# Patient Record
Sex: FEMALE | Race: WHITE | NOT HISPANIC OR LATINO | URBAN - METROPOLITAN AREA
[De-identification: names, ages, dates, MRNs, and addresses within clinical notes are randomized per-mention and may not be internally consistent; named-entity substitution may affect disease eponyms.]

---

## 2019-12-05 ENCOUNTER — INPATIENT (INPATIENT)
Facility: HOSPITAL | Age: 64
LOS: 1 days | Discharge: LEFT BEFORE DISCHARGE | DRG: 291 | End: 2019-12-07
Attending: SPECIALIST/TECHNOLOGIST CARDIOVASCULAR | Admitting: SPECIALIST/TECHNOLOGIST CARDIOVASCULAR
Payer: MEDICARE

## 2019-12-05 VITALS
SYSTOLIC BLOOD PRESSURE: 151 MMHG | HEART RATE: 104 BPM | DIASTOLIC BLOOD PRESSURE: 89 MMHG | TEMPERATURE: 98 F | OXYGEN SATURATION: 97 % | RESPIRATION RATE: 19 BRPM

## 2019-12-05 DIAGNOSIS — Z90.710 ACQUIRED ABSENCE OF BOTH CERVIX AND UTERUS: Chronic | ICD-10-CM

## 2019-12-05 LAB
ALBUMIN SERPL ELPH-MCNC: 4.2 G/DL — SIGNIFICANT CHANGE UP (ref 3.3–5)
ALP SERPL-CCNC: 71 U/L — SIGNIFICANT CHANGE UP (ref 40–120)
ALT FLD-CCNC: 27 U/L — SIGNIFICANT CHANGE UP (ref 10–45)
ANION GAP SERPL CALC-SCNC: 15 MMOL/L — SIGNIFICANT CHANGE UP (ref 5–17)
ANION GAP SERPL CALC-SCNC: 17 MMOL/L — SIGNIFICANT CHANGE UP (ref 5–17)
APPEARANCE UR: CLEAR — SIGNIFICANT CHANGE UP
AST SERPL-CCNC: 29 U/L — SIGNIFICANT CHANGE UP (ref 10–40)
BACTERIA # UR AUTO: PRESENT /HPF
BASE EXCESS BLDV CALC-SCNC: -0.4 MMOL/L — SIGNIFICANT CHANGE UP
BASOPHILS # BLD AUTO: 0.04 K/UL — SIGNIFICANT CHANGE UP (ref 0–0.2)
BASOPHILS NFR BLD AUTO: 0.4 % — SIGNIFICANT CHANGE UP (ref 0–2)
BILIRUB SERPL-MCNC: 1.6 MG/DL — HIGH (ref 0.2–1.2)
BILIRUB UR-MCNC: NEGATIVE — SIGNIFICANT CHANGE UP
BUN SERPL-MCNC: 22 MG/DL — SIGNIFICANT CHANGE UP (ref 7–23)
BUN SERPL-MCNC: 27 MG/DL — HIGH (ref 7–23)
CALCIUM SERPL-MCNC: 9.3 MG/DL — SIGNIFICANT CHANGE UP (ref 8.4–10.5)
CALCIUM SERPL-MCNC: 9.4 MG/DL — SIGNIFICANT CHANGE UP (ref 8.4–10.5)
CHLORIDE SERPL-SCNC: 101 MMOL/L — SIGNIFICANT CHANGE UP (ref 96–108)
CHLORIDE SERPL-SCNC: 102 MMOL/L — SIGNIFICANT CHANGE UP (ref 96–108)
CK MB CFR SERPL CALC: 5 NG/ML — SIGNIFICANT CHANGE UP (ref 0–6.7)
CK SERPL-CCNC: 88 U/L — SIGNIFICANT CHANGE UP (ref 25–170)
CO2 SERPL-SCNC: 22 MMOL/L — SIGNIFICANT CHANGE UP (ref 22–31)
CO2 SERPL-SCNC: 24 MMOL/L — SIGNIFICANT CHANGE UP (ref 22–31)
COLOR SPEC: YELLOW — SIGNIFICANT CHANGE UP
CREAT SERPL-MCNC: 0.81 MG/DL — SIGNIFICANT CHANGE UP (ref 0.5–1.3)
CREAT SERPL-MCNC: 0.9 MG/DL — SIGNIFICANT CHANGE UP (ref 0.5–1.3)
D DIMER BLD IA.RAPID-MCNC: 406 NG/ML DDU — HIGH
DIFF PNL FLD: NEGATIVE — SIGNIFICANT CHANGE UP
EOSINOPHIL # BLD AUTO: 0 K/UL — SIGNIFICANT CHANGE UP (ref 0–0.5)
EOSINOPHIL NFR BLD AUTO: 0 % — SIGNIFICANT CHANGE UP (ref 0–6)
EPI CELLS # UR: SIGNIFICANT CHANGE UP /HPF (ref 0–5)
GLUCOSE SERPL-MCNC: 118 MG/DL — HIGH (ref 70–99)
GLUCOSE SERPL-MCNC: 130 MG/DL — HIGH (ref 70–99)
GLUCOSE UR QL: NEGATIVE — SIGNIFICANT CHANGE UP
HCO3 BLDV-SCNC: 23 MMOL/L — SIGNIFICANT CHANGE UP (ref 20–27)
HCT VFR BLD CALC: 39.1 % — SIGNIFICANT CHANGE UP (ref 34.5–45)
HGB BLD-MCNC: 12.4 G/DL — SIGNIFICANT CHANGE UP (ref 11.5–15.5)
HYALINE CASTS # UR AUTO: SIGNIFICANT CHANGE UP /LPF (ref 0–2)
IMM GRANULOCYTES NFR BLD AUTO: 0.5 % — SIGNIFICANT CHANGE UP (ref 0–1.5)
KETONES UR-MCNC: NEGATIVE — SIGNIFICANT CHANGE UP
LACTATE SERPL-SCNC: 1.4 MMOL/L — SIGNIFICANT CHANGE UP (ref 0.5–2)
LEUKOCYTE ESTERASE UR-ACNC: NEGATIVE — SIGNIFICANT CHANGE UP
LIDOCAIN IGE QN: 7 U/L — SIGNIFICANT CHANGE UP (ref 7–60)
LYMPHOCYTES # BLD AUTO: 1.09 K/UL — SIGNIFICANT CHANGE UP (ref 1–3.3)
LYMPHOCYTES # BLD AUTO: 11.4 % — LOW (ref 13–44)
MAGNESIUM SERPL-MCNC: 2.1 MG/DL — SIGNIFICANT CHANGE UP (ref 1.6–2.6)
MCHC RBC-ENTMCNC: 31.5 PG — SIGNIFICANT CHANGE UP (ref 27–34)
MCHC RBC-ENTMCNC: 31.7 GM/DL — LOW (ref 32–36)
MCV RBC AUTO: 99.2 FL — SIGNIFICANT CHANGE UP (ref 80–100)
MONOCYTES # BLD AUTO: 0.56 K/UL — SIGNIFICANT CHANGE UP (ref 0–0.9)
MONOCYTES NFR BLD AUTO: 5.9 % — SIGNIFICANT CHANGE UP (ref 2–14)
NEUTROPHILS # BLD AUTO: 7.83 K/UL — HIGH (ref 1.8–7.4)
NEUTROPHILS NFR BLD AUTO: 81.8 % — HIGH (ref 43–77)
NITRITE UR-MCNC: POSITIVE
NRBC # BLD: 0 /100 WBCS — SIGNIFICANT CHANGE UP (ref 0–0)
NT-PROBNP SERPL-SCNC: HIGH PG/ML (ref 0–300)
PCO2 BLDV: 35 MMHG — LOW (ref 41–51)
PH BLDV: 7.44 — HIGH (ref 7.32–7.43)
PH UR: 6 — SIGNIFICANT CHANGE UP (ref 5–8)
PLATELET # BLD AUTO: 227 K/UL — SIGNIFICANT CHANGE UP (ref 150–400)
PO2 BLDV: 38 MMHG — SIGNIFICANT CHANGE UP
POTASSIUM SERPL-MCNC: 3.4 MMOL/L — LOW (ref 3.5–5.3)
POTASSIUM SERPL-MCNC: 4.2 MMOL/L — SIGNIFICANT CHANGE UP (ref 3.5–5.3)
POTASSIUM SERPL-SCNC: 3.4 MMOL/L — LOW (ref 3.5–5.3)
POTASSIUM SERPL-SCNC: 4.2 MMOL/L — SIGNIFICANT CHANGE UP (ref 3.5–5.3)
PROT SERPL-MCNC: 6.5 G/DL — SIGNIFICANT CHANGE UP (ref 6–8.3)
PROT UR-MCNC: 30 MG/DL
RAPID RVP RESULT: SIGNIFICANT CHANGE UP
RBC # BLD: 3.94 M/UL — SIGNIFICANT CHANGE UP (ref 3.8–5.2)
RBC # FLD: 13.2 % — SIGNIFICANT CHANGE UP (ref 10.3–14.5)
RBC CASTS # UR COMP ASSIST: < 5 /HPF — SIGNIFICANT CHANGE UP
SAO2 % BLDV: 66 % — SIGNIFICANT CHANGE UP
SODIUM SERPL-SCNC: 139 MMOL/L — SIGNIFICANT CHANGE UP (ref 135–145)
SODIUM SERPL-SCNC: 142 MMOL/L — SIGNIFICANT CHANGE UP (ref 135–145)
SP GR SPEC: 1.02 — SIGNIFICANT CHANGE UP (ref 1–1.03)
TROPONIN T SERPL-MCNC: <0.01 NG/ML — SIGNIFICANT CHANGE UP (ref 0–0.01)
TROPONIN T SERPL-MCNC: <0.01 NG/ML — SIGNIFICANT CHANGE UP (ref 0–0.01)
UROBILINOGEN FLD QL: 0.2 E.U./DL — SIGNIFICANT CHANGE UP
WBC # BLD: 9.57 K/UL — SIGNIFICANT CHANGE UP (ref 3.8–10.5)
WBC # FLD AUTO: 9.57 K/UL — SIGNIFICANT CHANGE UP (ref 3.8–10.5)
WBC UR QL: < 5 /HPF — SIGNIFICANT CHANGE UP

## 2019-12-05 PROCEDURE — 93010 ELECTROCARDIOGRAM REPORT: CPT

## 2019-12-05 PROCEDURE — 93306 TTE W/DOPPLER COMPLETE: CPT | Mod: 26

## 2019-12-05 PROCEDURE — 74019 RADEX ABDOMEN 2 VIEWS: CPT | Mod: 26

## 2019-12-05 PROCEDURE — 99291 CRITICAL CARE FIRST HOUR: CPT

## 2019-12-05 PROCEDURE — 99285 EMERGENCY DEPT VISIT HI MDM: CPT

## 2019-12-05 PROCEDURE — 71045 X-RAY EXAM CHEST 1 VIEW: CPT | Mod: 26

## 2019-12-05 RX ORDER — APIXABAN 2.5 MG/1
5 TABLET, FILM COATED ORAL EVERY 12 HOURS
Refills: 0 | Status: DISCONTINUED | OUTPATIENT
Start: 2019-12-05 | End: 2019-12-06

## 2019-12-05 RX ORDER — NITROGLYCERIN 6.5 MG
20 CAPSULE, EXTENDED RELEASE ORAL
Qty: 50 | Refills: 0 | Status: DISCONTINUED | OUTPATIENT
Start: 2019-12-05 | End: 2019-12-05

## 2019-12-05 RX ORDER — METOPROLOL TARTRATE 50 MG
0 TABLET ORAL
Qty: 30 | Refills: 0 | DISCHARGE

## 2019-12-05 RX ORDER — METOPROLOL TARTRATE 50 MG
25 TABLET ORAL EVERY 12 HOURS
Refills: 0 | Status: DISCONTINUED | OUTPATIENT
Start: 2019-12-06 | End: 2019-12-06

## 2019-12-05 RX ORDER — METOPROLOL TARTRATE 50 MG
12.5 TABLET ORAL EVERY 12 HOURS
Refills: 0 | Status: DISCONTINUED | OUTPATIENT
Start: 2019-12-05 | End: 2019-12-05

## 2019-12-05 RX ORDER — BUDESONIDE AND FORMOTEROL FUMARATE DIHYDRATE 160; 4.5 UG/1; UG/1
2 AEROSOL RESPIRATORY (INHALATION)
Refills: 0 | Status: DISCONTINUED | OUTPATIENT
Start: 2019-12-05 | End: 2019-12-07

## 2019-12-05 RX ORDER — INFLUENZA VIRUS VACCINE 15; 15; 15; 15 UG/.5ML; UG/.5ML; UG/.5ML; UG/.5ML
0.5 SUSPENSION INTRAMUSCULAR ONCE
Refills: 0 | Status: COMPLETED | OUTPATIENT
Start: 2019-12-05 | End: 2019-12-05

## 2019-12-05 RX ORDER — HYDRALAZINE HCL 50 MG
0 TABLET ORAL
Qty: 90 | Refills: 0 | DISCHARGE

## 2019-12-05 RX ORDER — NITROGLYCERIN 6.5 MG
0.4 CAPSULE, EXTENDED RELEASE ORAL
Refills: 0 | Status: COMPLETED | OUTPATIENT
Start: 2019-12-05 | End: 2019-12-05

## 2019-12-05 RX ORDER — POLYETHYLENE GLYCOL 3350 17 G/17G
17 POWDER, FOR SOLUTION ORAL DAILY
Refills: 0 | Status: DISCONTINUED | OUTPATIENT
Start: 2019-12-05 | End: 2019-12-06

## 2019-12-05 RX ORDER — CHLORHEXIDINE GLUCONATE 213 G/1000ML
1 SOLUTION TOPICAL
Refills: 0 | Status: DISCONTINUED | OUTPATIENT
Start: 2019-12-05 | End: 2019-12-07

## 2019-12-05 RX ORDER — FUROSEMIDE 40 MG
80 TABLET ORAL ONCE
Refills: 0 | Status: COMPLETED | OUTPATIENT
Start: 2019-12-05 | End: 2019-12-05

## 2019-12-05 RX ORDER — NICARDIPINE HYDROCHLORIDE 30 MG/1
5 CAPSULE, EXTENDED RELEASE ORAL
Qty: 40 | Refills: 0 | Status: DISCONTINUED | OUTPATIENT
Start: 2019-12-05 | End: 2019-12-05

## 2019-12-05 RX ORDER — NICARDIPINE HYDROCHLORIDE 30 MG/1
5 CAPSULE, EXTENDED RELEASE ORAL
Qty: 40 | Refills: 0 | Status: DISCONTINUED | OUTPATIENT
Start: 2019-12-05 | End: 2019-12-06

## 2019-12-05 RX ORDER — METOPROLOL TARTRATE 50 MG
12.5 TABLET ORAL ONCE
Refills: 0 | Status: COMPLETED | OUTPATIENT
Start: 2019-12-05 | End: 2019-12-05

## 2019-12-05 RX ORDER — POTASSIUM CHLORIDE 20 MEQ
40 PACKET (EA) ORAL EVERY 4 HOURS
Refills: 0 | Status: COMPLETED | OUTPATIENT
Start: 2019-12-05 | End: 2019-12-06

## 2019-12-05 RX ORDER — SIMVASTATIN 20 MG/1
20 TABLET, FILM COATED ORAL AT BEDTIME
Refills: 0 | Status: DISCONTINUED | OUTPATIENT
Start: 2019-12-05 | End: 2019-12-06

## 2019-12-05 RX ORDER — IPRATROPIUM/ALBUTEROL SULFATE 18-103MCG
3 AEROSOL WITH ADAPTER (GRAM) INHALATION EVERY 6 HOURS
Refills: 0 | Status: DISCONTINUED | OUTPATIENT
Start: 2019-12-05 | End: 2019-12-07

## 2019-12-05 RX ADMIN — Medication 0.4 MILLIGRAM(S): at 13:37

## 2019-12-05 RX ADMIN — Medication 40 MILLIEQUIVALENT(S): at 20:56

## 2019-12-05 RX ADMIN — Medication 0.4 MILLIGRAM(S): at 13:09

## 2019-12-05 RX ADMIN — APIXABAN 5 MILLIGRAM(S): 2.5 TABLET, FILM COATED ORAL at 20:51

## 2019-12-05 RX ADMIN — Medication 6 MICROGRAM(S)/MIN: at 15:08

## 2019-12-05 RX ADMIN — Medication 12.5 MILLIGRAM(S): at 23:59

## 2019-12-05 RX ADMIN — Medication 80 MILLIGRAM(S): at 13:08

## 2019-12-05 RX ADMIN — Medication 0.4 MILLIGRAM(S): at 12:33

## 2019-12-05 RX ADMIN — NICARDIPINE HYDROCHLORIDE 25 MG/HR: 30 CAPSULE, EXTENDED RELEASE ORAL at 17:24

## 2019-12-05 RX ADMIN — Medication 3 MILLILITER(S): at 21:49

## 2019-12-05 RX ADMIN — SIMVASTATIN 20 MILLIGRAM(S): 20 TABLET, FILM COATED ORAL at 20:56

## 2019-12-05 RX ADMIN — Medication 12.5 MILLIGRAM(S): at 20:51

## 2019-12-05 NOTE — ED PROVIDER NOTE - MUSCULOSKELETAL, MLM
trace edema b/l lower extremity. Pulse equal 2+ throughout. Spine appears normal, range of motion is not limited, no muscle or joint tenderness

## 2019-12-05 NOTE — ED PROVIDER NOTE - CLINICAL SUMMARY MEDICAL DECISION MAKING FREE TEXT BOX
65 y/o F with PMHx of CHF and CAD with stents p/w worsening sob and waxing and waning chest discomfort. Consider ACS. Likely recurrent CHF exacerbation. Less likely PE. D-dimer mildly elevated but negative if age adjusted. Would perform inpatient doppler. will give nitro, consider BiPAP,  and admit to cardiology.

## 2019-12-05 NOTE — ED ADULT NURSE REASSESSMENT NOTE - NS ED NURSE REASSESS COMMENT FT1
Patient a/ox 3, BIPAP in place, tolerating well, no c/o of chest pain or SOB, Nitro drip ongoing, titrated as ordered towards goal of MAP 85, currently at 50mcg/min, no adverse rxn. BP elevated, other vitals stable, O2 sat 98% on BIPAP.  Patient transported at this time to CCU in stable condition, Nitro drip and BIPAP to be discontinued in CCU.  Bedside report received by BARBY Duran.

## 2019-12-05 NOTE — H&P ADULT - NSHPREVIEWOFSYSTEMS_GEN_ALL_CORE
REVIEW OF SYSTEMS:  CONSTITUTIONAL: No fevers   EYES/ENT: No difficulty swallowing or throat pain  NECK: No neck pain  RESPIRATORY: no wheezing, + cough, + SOB  CARDIOVASCULAR: + chest pressure, + palpitations  GASTROINTESTINAL: Abdominal discomfort, increasing abdominal girth, constipation, passing small stools, no difficulty passing flatus  GENITOURINARY: No dysuria, frequency or hematuria  NEUROLOGICAL: + leg pain  SKIN: No itching, burning, rashes, or lesions   All other review of systems is negative unless indicated above.

## 2019-12-05 NOTE — ED ADULT TRIAGE NOTE - CHIEF COMPLAINT QUOTE
biba from home w/ c/o worsening dyspnea on exertion, chest pain x 2 weeks.  Also c/o constipation and abdominal pain.  Hx CHF.  given nitro sl x1 and ASA 162mg PO by ems w/ relief.

## 2019-12-05 NOTE — PROCEDURE NOTE - NSPROCDETAILS_GEN_ALL_CORE
positive blood return obtained via catheter/sutured in place/location identified, draped/prepped, sterile technique used, needle inserted/introduced/connected to a pressurized flush line

## 2019-12-05 NOTE — ED ADULT NURSE REASSESSMENT NOTE - NS ED NURSE REASSESS COMMENT FT1
Patient care and endorsement received from previous RN.  Patient a/ox 3, anxious, c/o of SOB, no coughing or fever, no chest pain.  BIPAP started, settings:  IPAP 10cm H2O  Rate 8  EPAP 5 cm/H20,  O2 40%.  Nitroglycerine drip 50mg/250ml  due to elevated HR and BP, started at 20mcg/min ( 6 ml ), to be titrated as ordered to reach MAP goal 85mmHg.  Patient using bedpan.  For admit.  Will continue to monitor and titrate as ordered.

## 2019-12-05 NOTE — H&P ADULT - HISTORY OF PRESENT ILLNESS
64F with pmhx of CHF (unknown echo), CAD (s/p stenting, 2015), HTN, HLD, poor memory, p/w 3-4 wk history progressive SOB/GUTIERREZ, dry cough, and mildly increased lower extremity edema, per patient in usual state of health until sxs developed 1 month ago, associated with palpitations, however no orthopnea or PND. Now w/ 1 day of acute onset, non-exertional 7/10 mid sternal chest pressure at rest radiating to R arm now resolved.      Vitals: T 97.4, -116, /133-151/189  Physical exam:-JVD, respiratory tachypnea and conversation dyspnea. Diffusely diminished breath sounds with mild crackle, trace b/l LE edema  Labs: wbc 9.57, hgb 12.4, hct 39.1, plt 227. BMP na 139, k 4.2, cl 102, BUN 27, cr 0.9, gluc 130. Trop T < 0.01, BNP 96972. VBG PH 7.44, PCO 2 35, PO2 38, hco3-23, O2 sat 66   EKG: Afib w/ LBBB  Imaging:  CXR with moderate pulmonary edema  Meds given: Lasix 80mg IVP, sublingual nitro, nitroglycerin infusion at 20 mcg/min 64F with pmhx of CHF (unknown when/where last echo performed), CAD (s/p stenting, 2015 @ [facility]), afib formally on coumadin) HTN, HLD, COPD poor memory, p/w 3-4 wk history progressive SOB/GUTIERREZ, dry cough, and mildly increased lower extremity edema, per patient in usual state of health until sxs developed 1 month ago, associated with palpitations, however no orthopnea or PND. Now w/ 1 day of acute onset, non-exertional 7/10 mid sternal chest pressure at rest radiating to R arm now resolved.      Vitals: T 97.4, -116, /133-151/189  Physical exam: -JVD, respiratory tachypnea and conversation dyspnea. Diffusely diminished breath sounds with mild crackle, trace b/l LE edema  Labs: wbc 9.57, hgb 12.4, hct 39.1, plt 227. BMP na 139, k 4.2, cl 102, BUN 27, cr 0.9, gluc 130. Trop T < 0.01, BNP 83749. VBG PH 7.44, PCO 2 35, PO2 38, hco3-23, O2 sat 66   EKG: Afib w/ LBBB  Imaging:  CXR with moderate pulmonary edema  Echo findings:    1. Technically difficult study.   2. There is mild concentric left ventricular hypertrophy. Abnormal   septal motion seen due to left bundle branch block. Left ventricular   systolic function is severely reduced with acalculated ejection fraction   of 15% with severe global hypokinesis.   3. Mildly dilated left atrium.   4. The inferior vena cava is dilated (>2.1cm) with abnormal inspiratory   collapse (<50%) consistent with elevated right atrial pressure (  15, range 10-20mmHg).   5. Right ventricular systolic function is probably normal. The right   ventricle is not well visualized.   6. Mild pulmonic regurgitation.   7. Pulmonary hypertension present, pulmonary artery systolic pressure is   50.00 mmHg.   8.No pericardial effusion.  Meds given: Lasix 80mg IVP, sublingual nitro, nitroglycerin infusion at 20 mcg/min

## 2019-12-05 NOTE — ED PROVIDER NOTE - OBJECTIVE STATEMENT
63 y/o F with PMHx of CHF and CAD with stents p/w 2 weeks of SOB and intermittent chest discomfort. SOB worse with exertion and laying flat. Chest discomfort not due to exertion. Pt also c/o lower extremity edema worse than usual. No Hx of PE or DVT. Denies fever

## 2019-12-05 NOTE — H&P ADULT - ASSESSMENT
64F w/ extensive CHF, CAD s/p stents 2015 at [facility] who presented with 1 month progressive GUTIERREZ, dry cough, and increasing LE extremity edema, and acute onset radiating 7/10 chest pressure BIBEMS, found to be hypertensive to SBPs 200s w/ PE consistent with signs of fluid overload, EKG w/ afib and LBBB no ischemic changes, CXR with moderate pulmonary congestion, and echo with reduced EF of 15%. Patient likely 2/2 to Acute on chronic exacerbation of heart failure exacerbated now 2/2 to elevated systolic BPs likely 2/2 to non-adherence w/ medication.    CARDIO:  #acute on chronic exacerbation of HFrEF.   On entresto, lopressor, lasix and hydralazine at home, p/w signs and sxs of fluid overload w/ echo as documented above. Now s/p 80mg Lasix IVP with adequate UOP (net negative -782cc) and improvement in SOB.  - Diurese as needed  - BIPAP at night   - hold home meds   - monitor I's and O's  - daily weights  - restart home meds when appropriate    #Hypertensive emergency  Patient p/w chest discomfort found to have elevated sbps> 200 in ED, trops negative  - Nicardipine gtt titrating to goal maps 110-120  - restart home meds as tolerated    #Longstanding afib  EKG on admission with afib and LBBB, no ischemic changes  -as patient now appearing clinically euvolemic will start on lopressor 12.5mg BID  - CHADS VASC >2, will eliquis 5mg BID     RESPIRATORY:  #SOB/Pulm edema  - likely 2/2 to CHF exacerbation plan as above    #COPD  likely not in exacerbation, as no increase mucous production, SOB chronic, not overtly wheezy on exam and with good air movement  - duonebs q6 hrs  - symbicort BID    RENAL:  Making good urine no active issues    GI:  Distended abdomen w/ small bowel movements endorsing passing gas, small bowel movements, likely 2/2 to urinary retention vs. constipation  - abdominal XR  - now with good UOP  - bowel regimen if no bowel movements after 24hrs      DVT prophylaxis: Eliquis    F: fluid restriction  E: replete to K > 4, Mg > 2  N: DASH TLC    FULL CODE

## 2019-12-05 NOTE — ED PROVIDER NOTE - CONSTITUTIONAL, MLM
normal... obese Well appearing, awake, alert, oriented to person, place, time/situation and in no apparent distress.

## 2019-12-05 NOTE — ED ADULT NURSE NOTE - OBJECTIVE STATEMENT
Pt is a 63 y/o female c/o chest pain and SOB x two weeks. Pr reports constipation. Pt diaphoretic, speaking in short, clear sentences. Pt denies fever, chills, N/V/D. Pt is a 65 y/o female c/o chest pain and SOB x two weeks. Pr reports constipation. Pt diaphoretic, speaking in short, clear sentences. Pt denies fever, chills, N/V/D. Pt reports last normal BM was 2 weeks ago, has been passing "small hard stools" since.

## 2019-12-05 NOTE — ED PROVIDER NOTE - RESPIRATORY, MLM
respiratory tachypnea and conversation dyspnea. Diffusely diminished breath sounds with mild crackles.

## 2019-12-05 NOTE — ED ADULT NURSE REASSESSMENT NOTE - NS ED NURSE REASSESS COMMENT FT1
Pt complaining of changing chest pain, found  to be short of breath, seated in tripod position. PT found to be increasingly tachychardic and hypertensive, see flow sheet for VS. MD Lamas made aware, EKG repeated, plan to treat with SL nitroglycerin.

## 2019-12-05 NOTE — H&P ADULT - NSHPLABSRESULTS_GEN_ALL_CORE
LABS:                         12.4   9.57  )-----------( 227      ( 05 Dec 2019 12:00 )             39.1         139  |  102  |  27<H>  ----------------------------<  130<H>  4.2   |  22  |  0.90    Ca    9.3      05 Dec 2019 12:00  Mg     2.1         TPro  6.5  /  Alb  4.2  /  TBili  1.6<H>  /  DBili  0.5<H>  /  AST  29  /  ALT  27  /  AlkPhos  71        Urinalysis Basic - ( 05 Dec 2019 13:49 )    Color: Yellow / Appearance: Clear / S.020 / pH: x  Gluc: x / Ketone: NEGATIVE  / Bili: Negative / Urobili: 0.2 E.U./dL   Blood: x / Protein: 30 mg/dL / Nitrite: POSITIVE   Leuk Esterase: NEGATIVE / RBC: < 5 /HPF / WBC < 5 /HPF   Sq Epi: x / Non Sq Epi: 0-5 /HPF / Bacteria: Present /HPF      CARDIAC MARKERS ( 05 Dec 2019 12:00 )  x     / <0.01 ng/mL / x     / x     / x          Serum Pro-Brain Natriuretic Peptide: 27189 pg/mL ( @ 12:00)      < from: Xray Chest 1 View-PORTABLE IMMEDIATE (19 @ 13:37) >    oderate pulmonary edema. Cardiomegaly.    < end of copied text > LABS:                         12.4   9.57  )-----------( 227      ( 05 Dec 2019 12:00 )             39.1         139  |  102  |  27<H>  ----------------------------<  130<H>  4.2   |  22  |  0.90    Ca    9.3      05 Dec 2019 12:00  Mg     2.1         TPro  6.5  /  Alb  4.2  /  TBili  1.6<H>  /  DBili  0.5<H>  /  AST  29  /  ALT  27  /  AlkPhos  71        Urinalysis Basic - ( 05 Dec 2019 13:49 )    Color: Yellow / Appearance: Clear / S.020 / pH: x  Gluc: x / Ketone: NEGATIVE  / Bili: Negative / Urobili: 0.2 E.U./dL   Blood: x / Protein: 30 mg/dL / Nitrite: POSITIVE   Leuk Esterase: NEGATIVE / RBC: < 5 /HPF / WBC < 5 /HPF   Sq Epi: x / Non Sq Epi: 0-5 /HPF / Bacteria: Present /HPF      CARDIAC MARKERS ( 05 Dec 2019 12:00 )  x     / <0.01 ng/mL / x     / x     / x          Serum Pro-Brain Natriuretic Peptide: 57194 pg/mL ( @ 12:00)      < from: Xray Chest 1 View-PORTABLE IMMEDIATE (19 @ 13:37) >    oderate pulmonary edema. Cardiomegaly.    < end of copied text >    < from: Echocardiogram (19 @ 15:53) >    CONCLUSIONS:     1. Technically difficult study.   2. There is mild concentric left ventricular hypertrophy. Abnormal   septal motion seen due to left bundle branch block. Left ventricular   systolic function is severely reduced with acalculated ejection fraction   of 15% with severe global hypokinesis.   3. Mildly dilated left atrium.   4. The inferior vena cava is dilated (>2.1cm) with abnormal inspiratory   collapse (<50%) consistent with elevated right atrial pressure (  15, range 10-20mmHg).   5. Right ventricular systolic function is probably normal. The right   ventricle is not well visualized.   6. Mild pulmonic regurgitation.   7. Pulmonary hypertension present, pulmonary artery systolic pressure is   50.00 mmHg.   8.No pericardial effusion.    < end of copied text >

## 2019-12-05 NOTE — H&P ADULT - NSHPPHYSICALEXAM_GEN_ALL_CORE
VITAL SIGNS:  T(C): 36.7 (12-05-19 @ 15:52), Max: 36.7 (12-05-19 @ 15:52)  T(F): 98.1 (12-05-19 @ 15:52), Max: 98.1 (12-05-19 @ 15:52)  HR: 116 (12-05-19 @ 16:24) (104 - 116)  BP: 198/122 (12-05-19 @ 16:24) (151/89 - 217/133)  BP(mean): --  RR: 20 (12-05-19 @ 16:24) (18 - 22)  SpO2: 98% (12-05-19 @ 16:24) (95% - 98%)  Wt(kg): --    PHYSICAL EXAM:    Constitutional: WDWN resting comfortably in bed; NAD, speaking in full sentences on NC  Head: NC/AT  Eyes: PERRL  ENT: DMM  Neck: JVP+  Respiratory: Protecting airway, speaking in full sentences, good air movement scatter trace crackles and scattered wheezes  Cardiac: +S1/S2; Tachycardic, irregular rhythm, no murmurs appreciated  Gastrointestinal: Distended but soft abdomen, hyper-tympanetic, BS+4, no rebound or guarding  Extremities: WWP, trace peripheral edema of lower extremities.  Musculoskeletal: NROM x4  Vascular: 2+ radial, femoral, DP/PT pulses B/L  Dermatologic: skin warm, dry and intact; no rashes, wounds, or scars  Neurologic: AAOx3; CNII-XII grossly intact; no focal deficits VITAL SIGNS:  T(C): 36.7 (12-05-19 @ 15:52), Max: 36.7 (12-05-19 @ 15:52)  T(F): 98.1 (12-05-19 @ 15:52), Max: 98.1 (12-05-19 @ 15:52)  HR: 116 (12-05-19 @ 16:24) (104 - 116)  BP: 198/122 (12-05-19 @ 16:24) (151/89 - 217/133)  BP(mean): --  RR: 20 (12-05-19 @ 16:24) (18 - 22)  SpO2: 98% (12-05-19 @ 16:24) (95% - 98%)  Wt(kg): --    PHYSICAL EXAM:    Constitutional: WDWN resting comfortably in bed; NAD, speaking in full sentences on NC  Head: NC/AT  Eyes: PERRL  ENT: DMM  Neck: JVP+  Respiratory: Protecting airway, speaking in full sentences, good air movement scatter trace crackles and scattered wheezes  Cardiac: +S1/S2; Tachycardic, irregular rhythm, no murmurs appreciated  Gastrointestinal: Distended but soft abdomen, BS+4, no rebound or guarding  Extremities: WWP, trace peripheral edema of lower extremities.  Musculoskeletal: NROM x4  Vascular: 2+ radial, femoral, DP/PT pulses B/L  Dermatologic: skin warm, dry and intact; no rashes, wounds, or scars  Neurologic: AAOx3; CNII-XII grossly intact; no focal deficits

## 2019-12-05 NOTE — H&P ADULT - ATTENDING COMMENTS
Critical Care Attestation    I have provided 60 minutes of critical care to this patient excluding procedures.    Pt is a 63 y/o woman c systolic CHF, HTN, Afib who presents with GUTIERREZ and worsening edema to the ED.    In the ED pt's Bp 214/133 and she was treated for HTN urgency. Bipap was started for acute respiratory failure    ECG: afib c LBBB/LVH @ 113bpm    Nitro gtt started and then switched to Nicardipine gtt  CXR: mild -mod pulm edema b/l    afeb, 130/90, , 20, 97%    Hgb 12.4  Plt 227  Cr 0.6    BNP 12K    TTE: EF 15%, CI 1.4, global hypo    - pt with htn urgency and hypoxemic acute resp failure  - pt with decompensated acute systolic CHF  - cont O2 supplementation and diuresis, pt oxygenating much better  - cont rx for HTN and simplify cardiac regimen  - cont entresto in pt and start on toprol  - pt with hi chads vasc score, start eliquis  - cont nebs in pt    afebrile,

## 2019-12-06 DIAGNOSIS — I25.10 ATHEROSCLEROTIC HEART DISEASE OF NATIVE CORONARY ARTERY WITHOUT ANGINA PECTORIS: ICD-10-CM

## 2019-12-06 DIAGNOSIS — R63.8 OTHER SYMPTOMS AND SIGNS CONCERNING FOOD AND FLUID INTAKE: ICD-10-CM

## 2019-12-06 DIAGNOSIS — J96.01 ACUTE RESPIRATORY FAILURE WITH HYPOXIA: ICD-10-CM

## 2019-12-06 DIAGNOSIS — I48.91 UNSPECIFIED ATRIAL FIBRILLATION: ICD-10-CM

## 2019-12-06 DIAGNOSIS — I16.1 HYPERTENSIVE EMERGENCY: ICD-10-CM

## 2019-12-06 DIAGNOSIS — Z29.9 ENCOUNTER FOR PROPHYLACTIC MEASURES, UNSPECIFIED: ICD-10-CM

## 2019-12-06 DIAGNOSIS — Z91.89 OTHER SPECIFIED PERSONAL RISK FACTORS, NOT ELSEWHERE CLASSIFIED: ICD-10-CM

## 2019-12-06 DIAGNOSIS — I50.23 ACUTE ON CHRONIC SYSTOLIC (CONGESTIVE) HEART FAILURE: ICD-10-CM

## 2019-12-06 DIAGNOSIS — K59.00 CONSTIPATION, UNSPECIFIED: ICD-10-CM

## 2019-12-06 LAB
ANION GAP SERPL CALC-SCNC: 13 MMOL/L — SIGNIFICANT CHANGE UP (ref 5–17)
ANION GAP SERPL CALC-SCNC: 15 MMOL/L — SIGNIFICANT CHANGE UP (ref 5–17)
APTT BLD: 31 SEC — SIGNIFICANT CHANGE UP (ref 27.5–36.3)
BUN SERPL-MCNC: 18 MG/DL — SIGNIFICANT CHANGE UP (ref 7–23)
BUN SERPL-MCNC: 21 MG/DL — SIGNIFICANT CHANGE UP (ref 7–23)
CALCIUM SERPL-MCNC: 8.8 MG/DL — SIGNIFICANT CHANGE UP (ref 8.4–10.5)
CALCIUM SERPL-MCNC: 9 MG/DL — SIGNIFICANT CHANGE UP (ref 8.4–10.5)
CHLORIDE SERPL-SCNC: 100 MMOL/L — SIGNIFICANT CHANGE UP (ref 96–108)
CHLORIDE SERPL-SCNC: 102 MMOL/L — SIGNIFICANT CHANGE UP (ref 96–108)
CO2 SERPL-SCNC: 25 MMOL/L — SIGNIFICANT CHANGE UP (ref 22–31)
CO2 SERPL-SCNC: 26 MMOL/L — SIGNIFICANT CHANGE UP (ref 22–31)
CREAT SERPL-MCNC: 0.85 MG/DL — SIGNIFICANT CHANGE UP (ref 0.5–1.3)
CREAT SERPL-MCNC: 0.88 MG/DL — SIGNIFICANT CHANGE UP (ref 0.5–1.3)
GLUCOSE SERPL-MCNC: 115 MG/DL — HIGH (ref 70–99)
GLUCOSE SERPL-MCNC: 163 MG/DL — HIGH (ref 70–99)
HCT VFR BLD CALC: 37.5 % — SIGNIFICANT CHANGE UP (ref 34.5–45)
HCV AB S/CO SERPL IA: 0.17 S/CO — SIGNIFICANT CHANGE UP
HCV AB SERPL-IMP: SIGNIFICANT CHANGE UP
HGB BLD-MCNC: 12.1 G/DL — SIGNIFICANT CHANGE UP (ref 11.5–15.5)
INR BLD: 2.04 — HIGH (ref 0.88–1.16)
MAGNESIUM SERPL-MCNC: 2.1 MG/DL — SIGNIFICANT CHANGE UP (ref 1.6–2.6)
MAGNESIUM SERPL-MCNC: 2.1 MG/DL — SIGNIFICANT CHANGE UP (ref 1.6–2.6)
MCHC RBC-ENTMCNC: 31.3 PG — SIGNIFICANT CHANGE UP (ref 27–34)
MCHC RBC-ENTMCNC: 32.3 GM/DL — SIGNIFICANT CHANGE UP (ref 32–36)
MCV RBC AUTO: 97.2 FL — SIGNIFICANT CHANGE UP (ref 80–100)
NRBC # BLD: 0 /100 WBCS — SIGNIFICANT CHANGE UP (ref 0–0)
PLATELET # BLD AUTO: 219 K/UL — SIGNIFICANT CHANGE UP (ref 150–400)
POTASSIUM SERPL-MCNC: 3.5 MMOL/L — SIGNIFICANT CHANGE UP (ref 3.5–5.3)
POTASSIUM SERPL-MCNC: 4.2 MMOL/L — SIGNIFICANT CHANGE UP (ref 3.5–5.3)
POTASSIUM SERPL-SCNC: 3.5 MMOL/L — SIGNIFICANT CHANGE UP (ref 3.5–5.3)
POTASSIUM SERPL-SCNC: 4.2 MMOL/L — SIGNIFICANT CHANGE UP (ref 3.5–5.3)
PROTHROM AB SERPL-ACNC: 23.6 SEC — HIGH (ref 10–12.9)
RBC # BLD: 3.86 M/UL — SIGNIFICANT CHANGE UP (ref 3.8–5.2)
RBC # FLD: 13.4 % — SIGNIFICANT CHANGE UP (ref 10.3–14.5)
SODIUM SERPL-SCNC: 140 MMOL/L — SIGNIFICANT CHANGE UP (ref 135–145)
SODIUM SERPL-SCNC: 141 MMOL/L — SIGNIFICANT CHANGE UP (ref 135–145)
WBC # BLD: 10.06 K/UL — SIGNIFICANT CHANGE UP (ref 3.8–10.5)
WBC # FLD AUTO: 10.06 K/UL — SIGNIFICANT CHANGE UP (ref 3.8–10.5)

## 2019-12-06 PROCEDURE — 71045 X-RAY EXAM CHEST 1 VIEW: CPT | Mod: 26

## 2019-12-06 PROCEDURE — 99291 CRITICAL CARE FIRST HOUR: CPT

## 2019-12-06 PROCEDURE — 93010 ELECTROCARDIOGRAM REPORT: CPT

## 2019-12-06 PROCEDURE — 99233 SBSQ HOSP IP/OBS HIGH 50: CPT

## 2019-12-06 RX ORDER — POLYETHYLENE GLYCOL 3350 17 G/17G
17 POWDER, FOR SOLUTION ORAL EVERY 12 HOURS
Refills: 0 | Status: DISCONTINUED | OUTPATIENT
Start: 2019-12-06 | End: 2019-12-07

## 2019-12-06 RX ORDER — POTASSIUM CHLORIDE 20 MEQ
40 PACKET (EA) ORAL ONCE
Refills: 0 | Status: COMPLETED | OUTPATIENT
Start: 2019-12-06 | End: 2019-12-06

## 2019-12-06 RX ORDER — ACETAMINOPHEN 500 MG
500 TABLET ORAL ONCE
Refills: 0 | Status: COMPLETED | OUTPATIENT
Start: 2019-12-06 | End: 2019-12-06

## 2019-12-06 RX ORDER — SACUBITRIL AND VALSARTAN 24; 26 MG/1; MG/1
1 TABLET, FILM COATED ORAL
Refills: 0 | Status: DISCONTINUED | OUTPATIENT
Start: 2019-12-06 | End: 2019-12-06

## 2019-12-06 RX ORDER — ASPIRIN/CALCIUM CARB/MAGNESIUM 324 MG
81 TABLET ORAL DAILY
Refills: 0 | Status: DISCONTINUED | OUTPATIENT
Start: 2019-12-06 | End: 2019-12-06

## 2019-12-06 RX ORDER — POTASSIUM CHLORIDE 20 MEQ
20 PACKET (EA) ORAL ONCE
Refills: 0 | Status: COMPLETED | OUTPATIENT
Start: 2019-12-06 | End: 2019-12-06

## 2019-12-06 RX ORDER — FUROSEMIDE 40 MG
40 TABLET ORAL EVERY 12 HOURS
Refills: 0 | Status: DISCONTINUED | OUTPATIENT
Start: 2019-12-06 | End: 2019-12-06

## 2019-12-06 RX ORDER — HEPARIN SODIUM 5000 [USP'U]/ML
1400 INJECTION INTRAVENOUS; SUBCUTANEOUS
Qty: 25000 | Refills: 0 | Status: DISCONTINUED | OUTPATIENT
Start: 2019-12-06 | End: 2019-12-07

## 2019-12-06 RX ORDER — METOPROLOL TARTRATE 50 MG
25 TABLET ORAL EVERY 12 HOURS
Refills: 0 | Status: DISCONTINUED | OUTPATIENT
Start: 2019-12-06 | End: 2019-12-06

## 2019-12-06 RX ORDER — SENNA PLUS 8.6 MG/1
1 TABLET ORAL EVERY 24 HOURS
Refills: 0 | Status: DISCONTINUED | OUTPATIENT
Start: 2019-12-06 | End: 2019-12-07

## 2019-12-06 RX ORDER — CLOPIDOGREL BISULFATE 75 MG/1
75 TABLET, FILM COATED ORAL DAILY
Refills: 0 | Status: DISCONTINUED | OUTPATIENT
Start: 2019-12-06 | End: 2019-12-07

## 2019-12-06 RX ORDER — METOPROLOL TARTRATE 50 MG
50 TABLET ORAL EVERY 12 HOURS
Refills: 0 | Status: DISCONTINUED | OUTPATIENT
Start: 2019-12-06 | End: 2019-12-07

## 2019-12-06 RX ORDER — SACUBITRIL AND VALSARTAN 24; 26 MG/1; MG/1
1 TABLET, FILM COATED ORAL EVERY 12 HOURS
Refills: 0 | Status: DISCONTINUED | OUTPATIENT
Start: 2019-12-06 | End: 2019-12-07

## 2019-12-06 RX ORDER — METOPROLOL TARTRATE 50 MG
50 TABLET ORAL EVERY 12 HOURS
Refills: 0 | Status: DISCONTINUED | OUTPATIENT
Start: 2019-12-06 | End: 2019-12-06

## 2019-12-06 RX ORDER — ATORVASTATIN CALCIUM 80 MG/1
80 TABLET, FILM COATED ORAL AT BEDTIME
Refills: 0 | Status: DISCONTINUED | OUTPATIENT
Start: 2019-12-06 | End: 2019-12-07

## 2019-12-06 RX ORDER — FUROSEMIDE 40 MG
40 TABLET ORAL EVERY 12 HOURS
Refills: 0 | Status: DISCONTINUED | OUTPATIENT
Start: 2019-12-06 | End: 2019-12-07

## 2019-12-06 RX ADMIN — SENNA PLUS 1 TABLET(S): 8.6 TABLET ORAL at 18:14

## 2019-12-06 RX ADMIN — Medication 40 MILLIEQUIVALENT(S): at 00:00

## 2019-12-06 RX ADMIN — Medication 3 MILLILITER(S): at 07:03

## 2019-12-06 RX ADMIN — CHLORHEXIDINE GLUCONATE 1 APPLICATION(S): 213 SOLUTION TOPICAL at 06:06

## 2019-12-06 RX ADMIN — Medication 40 MILLIGRAM(S): at 18:13

## 2019-12-06 RX ADMIN — BUDESONIDE AND FORMOTEROL FUMARATE DIHYDRATE 2 PUFF(S): 160; 4.5 AEROSOL RESPIRATORY (INHALATION) at 10:03

## 2019-12-06 RX ADMIN — HEPARIN SODIUM 14 UNIT(S)/HR: 5000 INJECTION INTRAVENOUS; SUBCUTANEOUS at 21:11

## 2019-12-06 RX ADMIN — CLOPIDOGREL BISULFATE 75 MILLIGRAM(S): 75 TABLET, FILM COATED ORAL at 11:46

## 2019-12-06 RX ADMIN — SACUBITRIL AND VALSARTAN 1 TABLET(S): 24; 26 TABLET, FILM COATED ORAL at 07:37

## 2019-12-06 RX ADMIN — APIXABAN 5 MILLIGRAM(S): 2.5 TABLET, FILM COATED ORAL at 09:28

## 2019-12-06 RX ADMIN — ATORVASTATIN CALCIUM 80 MILLIGRAM(S): 80 TABLET, FILM COATED ORAL at 21:24

## 2019-12-06 RX ADMIN — Medication 3 MILLILITER(S): at 16:18

## 2019-12-06 RX ADMIN — Medication 3 MILLILITER(S): at 21:24

## 2019-12-06 RX ADMIN — Medication 500 MILLIGRAM(S): at 03:44

## 2019-12-06 RX ADMIN — Medication 25 MILLIGRAM(S): at 07:37

## 2019-12-06 RX ADMIN — BUDESONIDE AND FORMOTEROL FUMARATE DIHYDRATE 2 PUFF(S): 160; 4.5 AEROSOL RESPIRATORY (INHALATION) at 21:25

## 2019-12-06 RX ADMIN — SACUBITRIL AND VALSARTAN 1 TABLET(S): 24; 26 TABLET, FILM COATED ORAL at 18:14

## 2019-12-06 RX ADMIN — Medication 3 MILLILITER(S): at 10:03

## 2019-12-06 RX ADMIN — Medication 500 MILLIGRAM(S): at 04:15

## 2019-12-06 RX ADMIN — Medication 40 MILLIEQUIVALENT(S): at 11:46

## 2019-12-06 RX ADMIN — Medication 40 MILLIGRAM(S): at 06:06

## 2019-12-06 RX ADMIN — POLYETHYLENE GLYCOL 3350 17 GRAM(S): 17 POWDER, FOR SOLUTION ORAL at 11:49

## 2019-12-06 RX ADMIN — Medication 50 MILLIGRAM(S): at 18:13

## 2019-12-06 NOTE — PROGRESS NOTE ADULT - PROBLEM SELECTOR PLAN 4
SLADE to RCA 7/10/2014. Patient originally on triple therapy.   - c/w plavix 75 and Eliquis 5mg BID SLADE to RCA 7/10/2014. Patient originally on triple therapy.   - c/w plavix 75  - Will transition from eliquis to heparin gtt tonight at 9pm.  - consider Cath after weekend as EF has decreased from 30% in June to 15%.

## 2019-12-06 NOTE — CONSULT NOTE ADULT - ASSESSMENT
64F w/ extensive CHF, CAD s/p stents 2015 at [facility] who presented with 1 month progressive GUTIERREZ, dry cough, and increasing LE extremity edema. Patient found to be in hypertensive emergency with acute on chronic CHF exacerbation. Patient doing better and ready for step down from CCU.     Acute on Chronic CHF exacerbation: Likely due to medication non-compliance. BP better controlled now and diuresing well.   -Continue with Lasix 40mg IVP BID.  -Nearing euvolemia and can likely be transitioned to PO diuretics tomorrow.  -Continue with Metoprolol 25mg BID and Entresto 24/26.   -Continue Atorvastatin, ASA, Eliquis.  -Continued conversation for repeat ischemic work up in setting of worsening EF.   -Patient will need eval for ICD when on GDMT for 3 months.   -Daily weights and EKGs.  -Strict I&Os.   -Incentive spirometer.   -Can make follow up appointment with Dr. Vaca or Sanjeev Peoples 1 week after discharge.     Afib: Rate controlled.  -Continue with Eliquis and Metoprolol.     Discussed with attending and primary team.

## 2019-12-06 NOTE — PROGRESS NOTE ADULT - ASSESSMENT
64F w/ extensive CHF, CAD s/p stents 2015 at [facility] who presented with 1 month progressive GUTIERREZ, dry cough, and increasing LE extremity edema, and acute onset radiating 7/10 chest pressure BIBEMS, found to be hypertensive to SBPs 200s w/ pulmonary edema consistent with signs of fluid overload, EKG w/ afib and LBBB no ischemic changes, CXR with moderate pulmonary congestion, and echo with reduced EF of 15%. Patient likely 2/2 to Acute on chronic exacerbation of heart failure exacerbated now 2/2 to elevated systolic BPs likely due to non-adherence w/ medication.

## 2019-12-06 NOTE — PROGRESS NOTE ADULT - ASSESSMENT
64F w/ extensive CHF, CAD s/p stents 2015 at [facility] who presented with 1 month progressive GUTIERREZ, dry cough, and increasing LE extremity edema, and acute onset radiating 7/10 chest pressure BIBEMS, found to be hypertensive to SBPs 200s w/ PE consistent with signs of fluid overload, EKG w/ afib and LBBB no ischemic changes, CXR with moderate pulmonary congestion, and echo with reduced EF of 15%. Patient likely 2/2 to Acute on chronic exacerbation of heart failure exacerbated now 2/2 to elevated systolic BPs likely 2/2 to non-adherence w/ medication.    CARDIO:  #acute on chronic exacerbation of HFrEF.   On entresto, lopressor, lasix and hydralazine at home, p/w signs and sxs of fluid overload w/ echo as documented above, still with trace crackles on exam, UOP net -4L.  - Lasix 40mg IVP BID  - Entresto 24/26 BID  - lopressor 25 mg BID  - Given patient with EF 15% and LBBB on EKG patient would be appropriate for BI-V/CRT    #Hypertensive emergency  BPs well controlled overnight, nicardipine gtt discontinued this am  - can c/w lasix, entresto and lopressor.     #Longstanding afib  EKG on admission with afib and LBBB, no ischemic changes  -as patient now appearing clinically euvolemic will start on lopressor 12.5mg BID  - CHADS VASC >2, eliquis 5mg BID     RESPIRATORY:  #Acute hypoxic respiratory failure-patient weaned off of bipap to nasal cannula, now on 3L NC  - likely 2/2 to CHF exacerbation plan as above, c/w diureses  - wean off NC    #COPD  likely not in exacerbation, as no increase mucous production, SOB chronic, not overtly wheezy on exam and with good air movement  - duonebs q6 hrs  - symbicort BID    RENAL:  Making good urine no active issues    GI:  Distended abdomen w/ small bowel movements endorsing passing gas, small bowel movements, likely 2/2 to urinary retention vs. constipation  - abdominal XR as above, no radiographic evidence of obstruction  - now with good UOP  - bowel regimen if no bowel movements after 24hrs    Neuro  Patient with longstanding memory problems, possibly on rivastigmine at home, AOX3 on exam.   - will attempt to obtain collateral form PCP      DVT prophylaxis: Eliquis    F: fluid restriction  E: replete to K > 4, Mg > 2  N: DASH TLC    FULL CODE 64F w/ extensive CHF, CAD s/p stents 2015 at [facility] who presented with 1 month progressive GUTIERREZ, dry cough, and increasing LE extremity edema, and acute onset radiating 7/10 chest pressure BIBEMS, found to be hypertensive to SBPs 200s w/ PE consistent with signs of fluid overload, EKG w/ afib and LBBB no ischemic changes, CXR with moderate pulmonary congestion, and echo with reduced EF of 15%. Patient likely 2/2 to Acute on chronic exacerbation of heart failure exacerbated now 2/2 to elevated systolic BPs likely 2/2 to non-adherence w/ medication.    CARDIO:  #acute on chronic exacerbation of HFrEF.   On entresto, lopressor, lasix and hydralazine at home, p/w signs and sxs of fluid overload w/ echo as documented above, still with trace crackles on exam, UOP net -4L.  - Lasix 40mg IVP BID  - Entresto 24/26 BID  - lopressor 25 mg BID  - Given patient with EF 15% and LBBB on EKG patient would be appropriate for BI-V/CRT    #Hypertensive emergency  BPs well controlled overnight, nicardipine gtt discontinued this am  - can c/w lasix, entresto and lopressor.     #Longstanding afib  EKG on admission with afib and LBBB, no ischemic changes  -as patient now appearing clinically euvolemic will start on lopressor 12.5mg BID  - CHADS VASC >2, eliquis 5mg BID     RESPIRATORY:  #Acute hypoxic respiratory failure-patient weaned off of bipap to nasal cannula, now on 3L NC  - likely 2/2 to CHF exacerbation plan as above, c/w diureses  - wean off NC    #asthma  likely not in exacerbation, as no increase mucous production, SOB chronic, not overtly wheezy on exam and with good air movement  - duonebs q6 hrs  - symbicort BID    RENAL:  Making good urine no active issues    GI:  Distended abdomen w/ small bowel movements endorsing passing gas, small bowel movements, likely 2/2 to urinary retention vs. constipation  - abdominal XR as above, no radiographic evidence of obstruction  - now with good UOP  - bowel regimen if no bowel movements after 24hrs    Neuro  Patient with longstanding memory problems, possibly on rivastigmine at home, AOX3 on exam.   - will attempt to obtain collateral form PCP      DVT prophylaxis: Eliquis    F: fluid restriction  E: replete to K > 4, Mg > 2  N: DASH TLC    FULL CODE 64F w/ extensive CHF, CAD s/p stents 2015 at [facility] who presented with 1 month progressive GUTIERREZ, dry cough, and increasing LE extremity edema, and acute onset radiating 7/10 chest pressure BIBEMS, found to be hypertensive to SBPs 200s w/ PE consistent with signs of fluid overload, EKG w/ afib and LBBB no ischemic changes, CXR with moderate pulmonary congestion, and echo with reduced EF of 15%. Patient likely 2/2 to Acute on chronic exacerbation of heart failure exacerbated now 2/2 to elevated systolic BPs likely 2/2 to non-adherence w/ medication.    CARDIO:  #acute on chronic exacerbation of HFrEF.   On entresto, lopressor, lasix and hydralazine at home, p/w signs and sxs of fluid overload w/ echo as documented above, still with trace crackles on exam, UOP net -4L.  - Lasix 40mg IVP BID  - Entresto 24/26 BID  - lopressor 25 mg BID  - Given patient with EF 15% and LBBB on EKG patient would be appropriate for BI-V/CRT    #Hypertensive emergency  BPs well controlled overnight, nicardipine gtt discontinued this am  - can c/w lasix, entresto and lopressor.     #afib of unknown duration  EKG on admission with afib and LBBB, no ischemic changes  -as patient now appearing clinically euvolemic will start on lopressor 12.5mg BID  - CHADS VASC >2, eliquis 5mg BID     RESPIRATORY:  #Acute hypoxic respiratory failure-patient weaned off of bipap to nasal cannula, now on 3L NC  - likely 2/2 to CHF exacerbation plan as above, c/w diureses  - wean off NC    #asthma  likely not in exacerbation, as no increase mucous production, SOB chronic, not overtly wheezy on exam and with good air movement  - duonebs q6 hrs  - symbicort BID    RENAL:  Making good urine no active issues    GI:  Distended abdomen w/ small bowel movements endorsing passing gas, small bowel movements, likely 2/2 to urinary retention vs. constipation  - abdominal XR as above, no radiographic evidence of obstruction  - now with good UOP  - bowel regimen if no bowel movements after 24hrs    Neuro  Patient with longstanding memory problems, possibly on rivastigmine at home, AOX3 on exam.   - will attempt to obtain collateral form PCP      DVT prophylaxis: Eliquis    F: fluid restriction  E: replete to K > 4, Mg > 2  N: DASH TLC    FULL CODE

## 2019-12-06 NOTE — CONSULT NOTE ADULT - SUBJECTIVE AND OBJECTIVE BOX
HPI:  64F with pmhx of CHF (unknown when/where last echo performed), CAD (s/p stenting, 2015 @ [facility]), afib formally on coumadin) HTN, HLD, COPD poor memory, p/w 3-4 wk history progressive SOB/GUTIERREZ, dry cough, and mildly increased lower extremity edema, per patient in usual state of health until sxs developed 1 month ago, associated with palpitations, however no orthopnea or PND. Now w/ 1 day of acute onset, non-exertional 7/10 mid sternal chest pressure at rest radiating to R arm now resolved.      Vitals: T 97.4, -116, /133-151/189  Physical exam: -JVD, respiratory tachypnea and conversation dyspnea. Diffusely diminished breath sounds with mild crackle, trace b/l LE edema  Labs: wbc 9.57, hgb 12.4, hct 39.1, plt 227. BMP na 139, k 4.2, cl 102, BUN 27, cr 0.9, gluc 130. Trop T < 0.01, BNP 52891. VBG PH 7.44, PCO 2 35, PO2 38, hco3-23, O2 sat 66   EKG: Afib w/ LBBB  Imaging:  CXR with moderate pulmonary edema  Echo findings:    1. Technically difficult study.   2. There is mild concentric left ventricular hypertrophy. Abnormal   septal motion seen due to left bundle branch block. Left ventricular   systolic function is severely reduced with acalculated ejection fraction   of 15% with severe global hypokinesis.   3. Mildly dilated left atrium.   4. The inferior vena cava is dilated (>2.1cm) with abnormal inspiratory   collapse (<50%) consistent with elevated right atrial pressure (  15, range 10-20mmHg).   5. Right ventricular systolic function is probably normal. The right   ventricle is not well visualized.   6. Mild pulmonic regurgitation.   7. Pulmonary hypertension present, pulmonary artery systolic pressure is   50.00 mmHg.   8.No pericardial effusion.  Meds given: Lasix 80mg IVP, sublingual nitro, nitroglycerin infusion at 20 mcg/min (05 Dec 2019 17:02)      ROS: A 10-point review of systems was otherwise negative.    PAST MEDICAL & SURGICAL HISTORY:  History of breast lump removal  S/P hysterectomy      SOCIAL HISTORY:  FAMILY HISTORY:  FH: cardiovascular disease      ALLERGIES: 	  No Known Allergies            MEDICATIONS:  albuterol/ipratropium for Nebulization 3 milliLiter(s) Nebulizer every 6 hours  apixaban 5 milliGRAM(s) Oral every 12 hours  atorvastatin 80 milliGRAM(s) Oral at bedtime  budesonide 160 MICROgram(s)/formoterol 4.5 MICROgram(s) Inhaler 2 Puff(s) Inhalation two times a day  chlorhexidine 2% Cloths 1 Application(s) Topical <User Schedule>  clopidogrel Tablet 75 milliGRAM(s) Oral daily  furosemide   Injectable 40 milliGRAM(s) IV Push every 12 hours  metoprolol tartrate 25 milliGRAM(s) Oral every 12 hours  polyethylene glycol 3350 17 Gram(s) Oral every 12 hours  potassium chloride    Tablet ER 20 milliEquivalent(s) Oral once  sacubitril 24 mG/valsartan 26 mG 1 Tablet(s) Oral every 12 hours  senna 1 Tablet(s) Oral every 24 hours      PHYSICAL EXAM:  T(C): 36.9 (12-06-19 @ 09:00), Max: 37.1 (12-06-19 @ 01:28)  HR: 96 (12-06-19 @ 11:30) (86 - 130)  BP: 138/90 (12-06-19 @ 11:30) (115/82 - 217/133)  RR: 22 (12-06-19 @ 11:30) (16 - 37)  SpO2: 95% (12-06-19 @ 11:30) (89% - 99%)  Wt(kg): --    GEN: Awake, comfortable. NAD.   HEENT: NCAT, PERRL, EOMI. Mucosa moist. No JVD.   RESP: CTA b/l  CV: RRR, normal s1/s2. No m/r/g.  ABD: Soft, NTND. BS+  EXT: Warm. No edema, clubbing, or cyanosis.   NEURO: AAOx3. No focal deficits.    I&O's Summary    05 Dec 2019 07:01  -  06 Dec 2019 07:00  --------------------------------------------------------  IN: 852.5 mL / OUT: 5270 mL / NET: -4417.5 mL    06 Dec 2019 07:01  -  06 Dec 2019 12:34  --------------------------------------------------------  IN: 320 mL / OUT: 0 mL / NET: 320 mL      Height (cm): 160 (12-05 @ 18:00)  Weight (kg): 77.2 (12-05 @ 18:00)  BMI (kg/m2): 30.2 (12-05 @ 18:00)  BSA (m2): 1.81 (12-05 @ 18:00)  	  LABS:	 	    CARDIAC MARKERS:                                  12.1   10.06 )-----------( 219      ( 06 Dec 2019 04:51 )             37.5     12-06    141  |  100  |  18  ----------------------------<  163<H>  3.5   |  26  |  0.85    Ca    9.0      06 Dec 2019 10:37  Phos  3.2     12-06  Mg     2.1     12-06    TPro  6.5  /  Alb  4.2  /  TBili  1.6<H>  /  DBili  0.5<H>  /  AST  29  /  ALT  27  /  AlkPhos  71  12-05    proBNP:   Lipid Profile:   HgA1c:   TSH:     TELEMETRY: 	    ECG:  	  RADIOLOGY:   ECHO:  STRESS:  CATH: HPI:  64F w/ extensive CHF, CAD s/p stents 2015 at [facility] who presented with 1 month progressive GUTIERREZ, dry cough, and increasing LE extremity edema, and acute onset radiating 7/10 chest pressure BIBEMS, found to be hypertensive to SBPs 200s w/ PE consistent with signs of fluid overload, EKG w/ afib and LBBB no ischemic changes, CXR with moderate pulmonary congestion, and echo with reduced EF of 15%. Patient likely 2/2 to Acute on chronic exacerbation of heart failure exacerbated now 2/2 to elevated systolic BPs likely 2/2 to non-adherence w/ medication.    Consulted for HF management and continued outpatient follow up. Patient feeling better now. Patient with lower EF and with known CAD and medication compliance. Patient wants to speak to family about possible cath to rule out worsening ischemic disease.      PAST MEDICAL & SURGICAL HISTORY:  History of breast lump removal  S/P hysterectomy      SOCIAL HISTORY: Denies smoking, EtOH, drug use.   FAMILY HISTORY:  FH: cardiovascular disease      ALLERGIES: 	  No Known Allergies            MEDICATIONS:  albuterol/ipratropium for Nebulization 3 milliLiter(s) Nebulizer every 6 hours  apixaban 5 milliGRAM(s) Oral every 12 hours  atorvastatin 80 milliGRAM(s) Oral at bedtime  budesonide 160 MICROgram(s)/formoterol 4.5 MICROgram(s) Inhaler 2 Puff(s) Inhalation two times a day  chlorhexidine 2% Cloths 1 Application(s) Topical <User Schedule>  clopidogrel Tablet 75 milliGRAM(s) Oral daily  furosemide   Injectable 40 milliGRAM(s) IV Push every 12 hours  metoprolol tartrate 25 milliGRAM(s) Oral every 12 hours  polyethylene glycol 3350 17 Gram(s) Oral every 12 hours  potassium chloride    Tablet ER 20 milliEquivalent(s) Oral once  sacubitril 24 mG/valsartan 26 mG 1 Tablet(s) Oral every 12 hours  senna 1 Tablet(s) Oral every 24 hours      PHYSICAL EXAM:  T(C): 36.9 (12-06-19 @ 09:00), Max: 37.1 (12-06-19 @ 01:28)  HR: 96 (12-06-19 @ 11:30) (86 - 130)  BP: 138/90 (12-06-19 @ 11:30) (115/82 - 217/133)  RR: 22 (12-06-19 @ 11:30) (16 - 37)  SpO2: 95% (12-06-19 @ 11:30) (89% - 99%)  Wt(kg): --    GEN: Awake, comfortable. NAD.   HEENT: NCAT, PERRL, EOMI. Mucosa moist. No JVD.   RESP: Crackles at B/L bases.   CV: Irregularly irregular, normal s1/s2. No m/r/g.  ABD: Soft, NTND. BS+  EXT: Warm. Trace edema B/L LE.   NEURO: AAOx3. No focal deficits.    I&O's Summary    05 Dec 2019 07:01  -  06 Dec 2019 07:00  --------------------------------------------------------  IN: 852.5 mL / OUT: 5270 mL / NET: -4417.5 mL    06 Dec 2019 07:01  -  06 Dec 2019 12:34  --------------------------------------------------------  IN: 320 mL / OUT: 0 mL / NET: 320 mL      Height (cm): 160 (12-05 @ 18:00)  Weight (kg): 77.2 (12-05 @ 18:00)  BMI (kg/m2): 30.2 (12-05 @ 18:00)  BSA (m2): 1.81 (12-05 @ 18:00)  	  LABS:	 	    CARDIAC MARKERS:  CARDIAC MARKERS ( 05 Dec 2019 19:26 )  x     / <0.01 ng/mL / 88 U/L / x     / 5.0 ng/mL  CARDIAC MARKERS ( 05 Dec 2019 12:00 )  x     / <0.01 ng/mL / x     / x     / x                                          12.1   10.06 )-----------( 219      ( 06 Dec 2019 04:51 )             37.5     12-06    141  |  100  |  18  ----------------------------<  163<H>  3.5   |  26  |  0.85    Ca    9.0      06 Dec 2019 10:37  Phos  3.2     12-06  Mg     2.1     12-06    TPro  6.5  /  Alb  4.2  /  TBili  1.6<H>  /  DBili  0.5<H>  /  AST  29  /  ALT  27  /  AlkPhos  71  12-05    proBNP:  76452    Lipid Profile:   HgA1c:   TSH:     TELEMETRY: 	Afib with PVCs.     ECG: AFib.  	  RADIOLOGY: < from: Xray Chest 1 View-PORTABLE IMMEDIATE (12.05.19 @ 13:37) >  Moderate pulmonary edema. Cardiomegaly.    < end of copied text >    ECHO:  < from: Echocardiogram (12.05.19 @ 15:53) >  1. Technically difficult study.   2. There is mild concentric left ventricular hypertrophy. Abnormal   septal motion seen due to left bundle branch block. Left ventricular   systolic function is severely reduced with acalculated ejection fraction   of 15% with severe global hypokinesis.   3. Mildly dilated left atrium.   4. The inferior vena cava is dilated (>2.1cm) with abnormal inspiratory   collapse (<50%) consistent with elevated right atrial pressure (  15, range 10-20mmHg).   5. Right ventricular systolic function is probably normal. The right   ventricle is not well visualized.   6. Mild pulmonic regurgitation.   7. Pulmonary hypertension present, pulmonary artery systolic pressure is   50.00 mmHg.   8.No pericardial effusion.

## 2019-12-06 NOTE — PROGRESS NOTE ADULT - PROBLEM SELECTOR PLAN 3
EKG on admission with afib and LBBB, no ischemic changes  -as patient now appearing clinically euvolemic will start on lopressor 12.5mg BID  - CHADS VASC >2  - c/w eliquis 5mg BID EKG on admission with afib and LBBB, no ischemic changes  -as patient now appearing clinically euvolemic will start on lopressor 12.5mg BID  - CHADS VASC >2  - transition from eliquis to heparin gtt tonight. EKG on admission with afib and LBBB, no ischemic changes  - lopressor 50 BID  - CHADS VASC >2  - transition from eliquis to heparin gtt tonight.

## 2019-12-06 NOTE — CONSULT NOTE ADULT - ATTENDING COMMENTS
64F w/ extensive CHF, CAD s/p stents 2015 at [facility] who presented with 1 month progressive GUTIERREZ, dry cough, and increasing LE extremity edema, and acute onset radiating 7/10 chest pressure BIBEMS, found to be hypertensive to SBPs 200s w/ PE consistent with signs of fluid overload, EKG w/ afib and LBBB no ischemic changes, CXR with moderate pulmonary congestion, and echo with reduced EF of 15%. Patient likely 2/2 to Acute on chronic exacerbation of heart failure exacerbated now 2/2 to elevated systolic BPs likely 2/2 to non-adherence w/ medication.    Consulted for HF management and continued outpatient follow up. Patient feeling better now. Patient with lower EF and with known CAD and medication compliance. Patient wants to speak to family about possible cath to rule out worsening ischemic disease.    Patient doing better and ready for step down from CCU.     Acute on Chronic CHF exacerbation: Likely due to medication non-compliance. BP better controlled now and diuresing well.   -Continue with Lasix 40mg IVP BID.  -Nearing euvolemia and can likely be transitioned to PO diuretics tomorrow.  -Continue with Metoprolol 25mg BID and Entresto 24/26.   On discharge switch metoprolol to Toprol 50  -Continue Atorvastatin, ASA, Eliquis.  -Continued conversation for repeat ischemic work up in setting of worsening EF.   -Patient will need eval for ICD when on GDMT for 3 months.   QRS likely not wide enough to qualify for CRT  -Can make follow up appointment with Dr. Vaca or Sanjeev Peoples 1 week after discharge.     Afib: Rate controlled.  -Continue with Eliquis and Metoprolol.    I have personally provided 30 minutes of critical care time concurrently with the fellow.  I have reviewed the fellow’s documentation and I agree with the fellow's assessment and plan of care.  PETERSON Vaca

## 2019-12-06 NOTE — PROGRESS NOTE ADULT - SUBJECTIVE AND OBJECTIVE BOX
OVERNIGHT EVENTS: Lactate overnight WNL, trops negative x2, abd x-ray WNL otherwise JANNET.      SUBJECTIVE / INTERVAL HPI: Patient seen and examined at bedside. No new acute complaints, denies CP, reports improvement in SOB and lower extremity edema.    Review of systems negative except as noted above.     VITAL SIGNS:  Vital Signs Last 24 Hrs  T(C): 36.9 (06 Dec 2019 05:05), Max: 37.1 (06 Dec 2019 01:28)  T(F): 98.4 (06 Dec 2019 05:05), Max: 98.7 (06 Dec 2019 01:28)  HR: 110 (06 Dec 2019 08:00) (96 - 130)  BP: 151/86 (05 Dec 2019 18:00) (143/110 - 217/133)  BP(mean): 104 (05 Dec 2019 18:00) (104 - 143)  RR: 30 (06 Dec 2019 08:00) (18 - 33)  SpO2: 95% (06 Dec 2019 08:00) (89% - 99%)      19 @ 07:01  -  19 @ 07:00  --------------------------------------------------------  IN: 852.5 mL / OUT: 5270 mL / NET: -4417.5 mL    19 @ 07:01  -  19 @ 08:25  --------------------------------------------------------  IN: 0 mL / OUT: 0 mL / NET: 0 mL      PHYSICAL EXAM:    General: WDWN, NAD, speaking in full sentences on 3L NC  HEENT: MMM  Neck: -JVD  Cardiovascular: +S1/S2; irregular, no murmurs  Respiratory: Trace bibasilar crackles  Gastrointestinal: soft, NT/ND; +BS4  Extremities: WWP; trace edema  Vascular: 2+ radial, DP pulses B/L  Neurological: AAOx3; no focal deficits    MEDICATIONS:  MEDICATIONS  (STANDING):  albuterol/ipratropium for Nebulization 3 milliLiter(s) Nebulizer every 6 hours  apixaban 5 milliGRAM(s) Oral every 12 hours  aspirin  chewable 81 milliGRAM(s) Oral daily  atorvastatin 80 milliGRAM(s) Oral at bedtime  budesonide 160 MICROgram(s)/formoterol 4.5 MICROgram(s) Inhaler 2 Puff(s) Inhalation two times a day  chlorhexidine 2% Cloths 1 Application(s) Topical <User Schedule>  furosemide   Injectable 40 milliGRAM(s) IV Push every 12 hours  metoprolol tartrate 25 milliGRAM(s) Oral every 12 hours  polyethylene glycol 3350 17 Gram(s) Oral daily  sacubitril 24 mG/valsartan 26 mG 1 Tablet(s) Oral every 12 hours    MEDICATIONS  (PRN):      ALLERGIES:  Allergies    No Known Allergies    Intolerances        LABS:                        12.1   10. )-----------( 219      ( 06 Dec 2019 04:51 )             37.5     12    140  |  102  |  21  ----------------------------<  115<H>  4.2   |  25  |  0.88    Ca    8.8      06 Dec 2019 04:51  Phos  3.2     12-  Mg     2.1         TPro  6.5  /  Alb  4.2  /  TBili  1.6<H>  /  DBili  0.5<H>  /  AST  29  /  ALT  27  /  AlkPhos  71  12-    PT/INR - ( 06 Dec 2019 04:51 )   PT: 23.6 sec;   INR: 2.04          PTT - ( 06 Dec 2019 04:51 )  PTT:31.0 sec  Urinalysis Basic - ( 05 Dec 2019 13:49 )    Color: Yellow / Appearance: Clear / S.020 / pH: x  Gluc: x / Ketone: NEGATIVE  / Bili: Negative / Urobili: 0.2 E.U./dL   Blood: x / Protein: 30 mg/dL / Nitrite: POSITIVE   Leuk Esterase: NEGATIVE / RBC: < 5 /HPF / WBC < 5 /HPF   Sq Epi: x / Non Sq Epi: 0-5 /HPF / Bacteria: Present /HPF      CAPILLARY BLOOD GLUCOSE        CARDIAC MARKERS ( 05 Dec 2019 19:26 )  x     / <0.01 ng/mL / 88 U/L / x     / 5.0 ng/mL  CARDIAC MARKERS ( 05 Dec 2019 12:00 )  x     / <0.01 ng/mL / x     / x     / x          EKG: afib w/ LBBB  CXR: CXR w/ improvement in pulmonary vascular congestion  < from: Xray Abdomen 2 View PORTABLE -Urgent (19 @ 20:24) >  IMPRESSION:  No evidence of obstruction.    < end of copied text >      < from: Echocardiogram (19 @ 15:53) >  CONCLUSIONS:     1. Technically difficult study.   2. There is mild concentric left ventricular hypertrophy. Abnormal   septal motion seen due to left bundle branch block. Left ventricular   systolic function is severely reduced with acalculated ejection fraction   of 15% with severe global hypokinesis.   3. Mildly dilated left atrium.   4. The inferior vena cava is dilated (>2.1cm) with abnormal inspiratory   collapse (<50%) consistent with elevated right atrial pressure (  15, range 10-20mmHg).   5. Right ventricular systolic function is probably normal. The right   ventricle is not well visualized.   6. Mild pulmonic regurgitation.   7. Pulmonary hypertension present, pulmonary artery systolic pressure is   50.00 mmHg.   8.No pericardial effusion.    ---------------------------------------------------------------------------  -----  FINDINGS:     Left Ventricle:  There is mild concentric left ventricular hypertrophy. Abnormal septal   motion seen due to left bundle branch block. Left ventricular systolic   function is severely reduced with a calculated ejection fraction of 15%   with severe global hypokinesis.     Right Ventricle:  Right ventricular systolic function is rfftdg-hy-jzxyzypbsl reduced. The   tricuspidannular plane systolic excursion (TAPSE) is 13.00 mm (normal   >=17 mm). The right ventricle is not well visualized.     Left Atrium:  The left atrium is mildly dilated.     Right Atrium:  The right atrium is normal in size.     Aortic Valve:  Fibrocalcific tricuspid aortic valve without significant stenosis. There   is no evidence of aortic regurgitation.     Tricuspid Valve:  There is mild tricuspid regurgitation. There is moderate pulmonary   hypertension, pulmonary artery systolic pressure is50.00 mmHg.     Inferior Vena Cava:  The inferior vena cava is dilated (>2.1cm) with abnormal inspiratory   collapse (<50%) consistent with elevated right atrial pressure (  15, range 10-20mmHg).     Pulmonic Valve:  The pulmonic valve is not well visualized. There is mild pulmonic   regurgitation.     Aorta:  The aortic root is normal in size.     Pericardium:  No pericardial effusion is seen.     ---------------------------------------------------------------------------  -----  2D AND M-MODE MEASUREMENTS (normal ranges within parentheses):     Left Ventricle:         Normal - Men Normal - Women  IVSd (2D):      1.22 cm  (0.6-1.0)     (0.6-0.9)  LVPWd (2D):     1.29 cm  (0.6-1.0)     (0.6-0.9)  LVIDd (2D):     4.82 cm  (4.2-5.8)     (3.8-5.2)  LVIDs (2D):     4.38 cm  LV FS (2D):      9.1 %     (>25%)  LV EF (MOD BP):  25 %      (>55%)  Aorta/Left Atrium:          Normal  Aortic Root (2D):  2.54 cm (2.4-3.7)  Left Atrium (2D)   4.45 cm (1.9-4.0)     Right Ventricle:    LV DIASTOLIC FUNCTION:     MV Peak E: 93.60 cm/s MV e' lat: 4.1 cm/s MV E/e' lat ratio: 22.9                        MV e' med: 5.0 cm/s MV E/e' med ratio: 18.6    SPECTRAL DOPPLER ANALYSIS:     Aortic Valve: AoV Max Genaro:    0.98 m/s AoV Peak PG:   3.87 mmHg AoV Mean   P.10 mmHg  LVOT Vmax:      0.80 m/s LVOT VTI:      0.111 m   LVOT Diameter: 1.70 cm  AoV Area, VMax: 1.85 cm² AoV Area, VTI: 1.81 cm²  AoV Area, Vmn:    Tricuspid Valve and PA/RV Systolic Pressure: TR Max Velocity: 3.01 m/s RA   Pressure: 3 mmHg RVSP/PASP: 39.2 mmHg  TAPSE:           13 mm    RV S':       Thelma Jeffries MD    Electronically signed by Thelma Jeffries MD  Signature Date/Time: 2019/5:46:47 PM         *** Final ***        < end of copied text > Extra collateral and pmhx obtained from PCP and cardiologist. Pmhx of asthma, breast CA (, s/p XRT ) uterine cancer s/p hysterectomy and oophrectomy , CAD (s/p SLADE to RCA 7/10/2014), depression. Per PCP office patient non-adherent with meds, has refused catheterization procedure. EKG -sinus bradycardia possibly q waves II,IIII, avF. EKG  NSR @ 77, LAD. Stress test -defects in anteroseptum, mid-distal, anterior wall, EF 36%. TTE 2019-LAE, SAMEER, LVH, EF 30%, mild-mod MR, mild TR, mild pulm HTN, SPAP 45. Outpatient cardiologist reccomended cath for patient in past but she has refused.  Per PCP outpatient meds include: ranexa, buspirone, asa, plavix, simvastatin, recently transitioned to entresto in , nexium, toprol XL, mitrazapine, hctz, montelukast, breoelipta.     OVERNIGHT EVENTS: Lactate overnight WNL, trops negative x2, abd x-ray WNL otherwise JANNET.      SUBJECTIVE / INTERVAL HPI: Patient seen and examined at bedside. No new acute complaints, denies CP, reports improvement in SOB and lower extremity edema.    Review of systems negative except as noted above.     VITAL SIGNS:  Vital Signs Last 24 Hrs  T(C): 36.9 (06 Dec 2019 05:05), Max: 37.1 (06 Dec 2019 01:28)  T(F): 98.4 (06 Dec 2019 05:05), Max: 98.7 (06 Dec 2019 01:28)  HR: 110 (06 Dec 2019 08:00) (96 - 130)  BP: 151/86 (05 Dec 2019 18:00) (143/110 - 217/133)  BP(mean): 104 (05 Dec 2019 18:00) (104 - 143)  RR: 30 (06 Dec 2019 08:00) (18 - 33)  SpO2: 95% (06 Dec 2019 08:00) (89% - 99%)      19 @ 07:01  -  19 @ 07:00  --------------------------------------------------------  IN: 852.5 mL / OUT: 5270 mL / NET: -4417.5 mL    19 @ 07:01  -  19 @ 08:25  --------------------------------------------------------  IN: 0 mL / OUT: 0 mL / NET: 0 mL      PHYSICAL EXAM:    General: WDWN, NAD, speaking in full sentences on 3L NC  HEENT: MMM  Neck: -JVD  Cardiovascular: +S1/S2; irregular, no murmurs  Respiratory: Trace bibasilar crackles  Gastrointestinal: soft, NT/ND; +BS4  Extremities: WWP; trace edema  Vascular: 2+ radial, DP pulses B/L  Neurological: AAOx3; no focal deficits    MEDICATIONS:  MEDICATIONS  (STANDING):  albuterol/ipratropium for Nebulization 3 milliLiter(s) Nebulizer every 6 hours  apixaban 5 milliGRAM(s) Oral every 12 hours  aspirin  chewable 81 milliGRAM(s) Oral daily  atorvastatin 80 milliGRAM(s) Oral at bedtime  budesonide 160 MICROgram(s)/formoterol 4.5 MICROgram(s) Inhaler 2 Puff(s) Inhalation two times a day  chlorhexidine 2% Cloths 1 Application(s) Topical <User Schedule>  furosemide   Injectable 40 milliGRAM(s) IV Push every 12 hours  metoprolol tartrate 25 milliGRAM(s) Oral every 12 hours  polyethylene glycol 3350 17 Gram(s) Oral daily  sacubitril 24 mG/valsartan 26 mG 1 Tablet(s) Oral every 12 hours    MEDICATIONS  (PRN):      ALLERGIES:  Allergies    No Known Allergies    Intolerances        LABS:                        12.1   10 )-----------( 219      ( 06 Dec 2019 04:51 )             37.5         140  |  102  |  21  ----------------------------<  115<H>  4.2   |  25  |  0.88    Ca    8.8      06 Dec 2019 04:51  Phos  3.2     12  Mg     2.1     12-    TPro  6.5  /  Alb  4.2  /  TBili  1.6<H>  /  DBili  0.5<H>  /  AST  29  /  ALT  27  /  AlkPhos  71  12-    PT/INR - ( 06 Dec 2019 04:51 )   PT: 23.6 sec;   INR: 2.04          PTT - ( 06 Dec 2019 04:51 )  PTT:31.0 sec  Urinalysis Basic - ( 05 Dec 2019 13:49 )    Color: Yellow / Appearance: Clear / S.020 / pH: x  Gluc: x / Ketone: NEGATIVE  / Bili: Negative / Urobili: 0.2 E.U./dL   Blood: x / Protein: 30 mg/dL / Nitrite: POSITIVE   Leuk Esterase: NEGATIVE / RBC: < 5 /HPF / WBC < 5 /HPF   Sq Epi: x / Non Sq Epi: 0-5 /HPF / Bacteria: Present /HPF      CAPILLARY BLOOD GLUCOSE        CARDIAC MARKERS ( 05 Dec 2019 19:26 )  x     / <0.01 ng/mL / 88 U/L / x     / 5.0 ng/mL  CARDIAC MARKERS ( 05 Dec 2019 12:00 )  x     / <0.01 ng/mL / x     / x     / x          EKG: afib w/ LBBB  CXR: CXR w/ improvement in pulmonary vascular congestion  < from: Xray Abdomen 2 View PORTABLE -Urgent (19 @ 20:24) >  IMPRESSION:  No evidence of obstruction.    < end of copied text >      < from: Echocardiogram (19 @ 15:53) >  CONCLUSIONS:     1. Technically difficult study.   2. There is mild concentric left ventricular hypertrophy. Abnormal   septal motion seen due to left bundle branch block. Left ventricular   systolic function is severely reduced with acalculated ejection fraction   of 15% with severe global hypokinesis.   3. Mildly dilated left atrium.   4. The inferior vena cava is dilated (>2.1cm) with abnormal inspiratory   collapse (<50%) consistent with elevated right atrial pressure (  15, range 10-20mmHg).   5. Right ventricular systolic function is probably normal. The right   ventricle is not well visualized.   6. Mild pulmonic regurgitation.   7. Pulmonary hypertension present, pulmonary artery systolic pressure is   50.00 mmHg.   8.No pericardial effusion.    ---------------------------------------------------------------------------  -----  FINDINGS:     Left Ventricle:  There is mild concentric left ventricular hypertrophy. Abnormal septal   motion seen due to left bundle branch block. Left ventricular systolic   function is severely reduced with a calculated ejection fraction of 15%   with severe global hypokinesis.     Right Ventricle:  Right ventricular systolic function is gniisd-om-nzjxupakob reduced. The   tricuspidannular plane systolic excursion (TAPSE) is 13.00 mm (normal   >=17 mm). The right ventricle is not well visualized.     Left Atrium:  The left atrium is mildly dilated.     Right Atrium:  The right atrium is normal in size.     Aortic Valve:  Fibrocalcific tricuspid aortic valve without significant stenosis. There   is no evidence of aortic regurgitation.     Tricuspid Valve:  There is mild tricuspid regurgitation. There is moderate pulmonary   hypertension, pulmonary artery systolic pressure is50.00 mmHg.     Inferior Vena Cava:  The inferior vena cava is dilated (>2.1cm) with abnormal inspiratory   collapse (<50%) consistent with elevated right atrial pressure (  15, range 10-20mmHg).     Pulmonic Valve:  The pulmonic valve is not well visualized. There is mild pulmonic   regurgitation.     Aorta:  The aortic root is normal in size.     Pericardium:  No pericardial effusion is seen.     ---------------------------------------------------------------------------  -----  2D AND M-MODE MEASUREMENTS (normal ranges within parentheses):     Left Ventricle:         Normal - Men Normal - Women  IVSd (2D):      1.22 cm  (0.6-1.0)     (0.6-0.9)  LVPWd (2D):     1.29 cm  (0.6-1.0)     (0.6-0.9)  LVIDd (2D):     4.82 cm  (4.2-5.8)     (3.8-5.2)  LVIDs (2D):     4.38 cm  LV FS (2D):      9.1 %     (>25%)  LV EF (MOD BP):  25 %      (>55%)  Aorta/Left Atrium:          Normal  Aortic Root (2D):  2.54 cm (2.4-3.7)  Left Atrium (2D)   4.45 cm (1.9-4.0)     Right Ventricle:    LV DIASTOLIC FUNCTION:     MV Peak E: 93.60 cm/s MV e' lat: 4.1 cm/s MV E/e' lat ratio: 22.9                        MV e' med: 5.0 cm/s MV E/e' med ratio: 18.6    SPECTRAL DOPPLER ANALYSIS:     Aortic Valve: AoV Max Genaro:    0.98 m/s AoV Peak PG:   3.87 mmHg AoV Mean   P.10 mmHg  LVOT Vmax:      0.80 m/s LVOT VTI:      0.111 m   LVOT Diameter: 1.70 cm  AoV Area, VMax: 1.85 cm² AoV Area, VTI: 1.81 cm²  AoV Area, Vmn:    Tricuspid Valve and PA/RV Systolic Pressure: TR Max Velocity: 3.01 m/s RA   Pressure: 3 mmHg RVSP/PASP: 39.2 mmHg  TAPSE:           13 mm    RV S':       Thelma Jeffries MD    Electronically signed by Thelma Jeffries MD  Signature Date/Time: 2019/5:46:47 PM         *** Final ***        < end of copied text > Transfer note from CCU to cardiac telemetry:  64F with pmhx of CHF (EF 30% in 2019, now with in house ECHO showing EF 15%) asthma, breast CA (, s/p XRT ) uterine cancer s/p hysterectomy and oophrectomy , CAD (s/p SLADE to RCA 7/10/2014), depression p/w 3-4 wk history progressive SOB/GUTIERREZ, dry cough, and mildly increased lower extremity edema, found to be hypertensive with systolic BPs 200s, and with signs c/w fluid overload on exam in ED. Patient diuresed, placed on bipap, and started nitro gtt. Upon arrival to ccu L radial a line placed, patient switched to nicardipine gtt, weaned of bipap and with good uop. This am patient stable on RA, discontinued nicardipine gtt, started on entresto, 40mg IVP lasix, and increased lopressor dose. Patient now stable enough for step down to cardiac telemetry unit.           Extra collateral and pmhx obtained from PCP and cardiologist. Pmhx of asthma, breast CA (, s/p XRT ) uterine cancer s/p hysterectomy and oophrectomy , CAD (s/p SLADE to RCA 7/10/2014), depression. Per PCP office patient non-adherent with meds, has refused catheterization procedure. EKG -sinus bradycardia possibly q waves II,IIII, avF. EKG  NSR @ 77, LAD. Stress test -defects in anteroseptum, mid-distal, anterior wall, EF 36%. TTE 2019-LAE, SAMEER, LVH, EF 30%, mild-mod MR, mild TR, mild pulm HTN, SPAP 45. Outpatient cardiologist reccomended cath for patient in past but she has refused.  Per PCP outpatient meds include: ranexa, buspirone, asa, plavix, simvastatin, recently transitioned to entresto in , nexium, toprol XL, mitrazapine, hctz, montelukast, breoelipta.     OVERNIGHT EVENTS: Lactate overnight WNL, trops negative x2, abd x-ray WNL otherwise JANNET.      SUBJECTIVE / INTERVAL HPI: Patient seen and examined at bedside. No new acute complaints, denies CP, reports improvement in SOB and lower extremity edema.    Review of systems negative except as noted above.     VITAL SIGNS:  Vital Signs Last 24 Hrs  T(C): 36.9 (06 Dec 2019 05:05), Max: 37.1 (06 Dec 2019 01:28)  T(F): 98.4 (06 Dec 2019 05:05), Max: 98.7 (06 Dec 2019 01:28)  HR: 110 (06 Dec 2019 08:00) (96 - 130)  BP: 151/86 (05 Dec 2019 18:00) (143/110 - 217/133)  BP(mean): 104 (05 Dec 2019 18:00) (104 - 143)  RR: 30 (06 Dec 2019 08:00) (18 - 33)  SpO2: 95% (06 Dec 2019 08:00) (89% - 99%)      19 @ 07:01  -  19 @ 07:00  --------------------------------------------------------  IN: 852.5 mL / OUT: 5270 mL / NET: -4417.5 mL    19 @ 07:01  -  19 @ 08:25  --------------------------------------------------------  IN: 0 mL / OUT: 0 mL / NET: 0 mL      PHYSICAL EXAM:    General: WDWN, NAD, speaking in full sentences on 3L NC  HEENT: MMM  Neck: -JVD  Cardiovascular: +S1/S2; irregular, no murmurs  Respiratory: Trace bibasilar crackles  Gastrointestinal: soft, NT/ND; +BS4  Extremities: WWP; trace edema  Vascular: 2+ radial, DP pulses B/L  Neurological: AAOx3; no focal deficits    MEDICATIONS:  MEDICATIONS  (STANDING):  albuterol/ipratropium for Nebulization 3 milliLiter(s) Nebulizer every 6 hours  apixaban 5 milliGRAM(s) Oral every 12 hours  aspirin  chewable 81 milliGRAM(s) Oral daily  atorvastatin 80 milliGRAM(s) Oral at bedtime  budesonide 160 MICROgram(s)/formoterol 4.5 MICROgram(s) Inhaler 2 Puff(s) Inhalation two times a day  chlorhexidine 2% Cloths 1 Application(s) Topical <User Schedule>  furosemide   Injectable 40 milliGRAM(s) IV Push every 12 hours  metoprolol tartrate 25 milliGRAM(s) Oral every 12 hours  polyethylene glycol 3350 17 Gram(s) Oral daily  sacubitril 24 mG/valsartan 26 mG 1 Tablet(s) Oral every 12 hours    MEDICATIONS  (PRN):      ALLERGIES:  Allergies    No Known Allergies    Intolerances        LABS:                        12.1   10.06 )-----------( 219      ( 06 Dec 2019 04:51 )             37.5     12-    140  |  102  |  21  ----------------------------<  115<H>  4.2   |  25  |  0.88    Ca    8.8      06 Dec 2019 04:51  Phos  3.2     12-  Mg     2.1         TPro  6.5  /  Alb  4.2  /  TBili  1.6<H>  /  DBili  0.5<H>  /  AST  29  /  ALT  27  /  AlkPhos  71  12-05    PT/INR - ( 06 Dec 2019 04:51 )   PT: 23.6 sec;   INR: 2.04          PTT - ( 06 Dec 2019 04:51 )  PTT:31.0 sec  Urinalysis Basic - ( 05 Dec 2019 13:49 )    Color: Yellow / Appearance: Clear / S.020 / pH: x  Gluc: x / Ketone: NEGATIVE  / Bili: Negative / Urobili: 0.2 E.U./dL   Blood: x / Protein: 30 mg/dL / Nitrite: POSITIVE   Leuk Esterase: NEGATIVE / RBC: < 5 /HPF / WBC < 5 /HPF   Sq Epi: x / Non Sq Epi: 0-5 /HPF / Bacteria: Present /HPF      CAPILLARY BLOOD GLUCOSE        CARDIAC MARKERS ( 05 Dec 2019 19:26 )  x     / <0.01 ng/mL / 88 U/L / x     / 5.0 ng/mL  CARDIAC MARKERS ( 05 Dec 2019 12:00 )  x     / <0.01 ng/mL / x     / x     / x          EKG: afib w/ LBBB  CXR: CXR w/ improvement in pulmonary vascular congestion  < from: Xray Abdomen 2 View PORTABLE -Urgent (19 @ 20:24) >  IMPRESSION:  No evidence of obstruction.    < end of copied text >      < from: Echocardiogram (19 @ 15:53) >  CONCLUSIONS:     1. Technically difficult study.   2. There is mild concentric left ventricular hypertrophy. Abnormal   septal motion seen due to left bundle branch block. Left ventricular   systolic function is severely reduced with acalculated ejection fraction   of 15% with severe global hypokinesis.   3. Mildly dilated left atrium.   4. The inferior vena cava is dilated (>2.1cm) with abnormal inspiratory   collapse (<50%) consistent with elevated right atrial pressure (  15, range 10-20mmHg).   5. Right ventricular systolic function is probably normal. The right   ventricle is not well visualized.   6. Mild pulmonic regurgitation.   7. Pulmonary hypertension present, pulmonary artery systolic pressure is   50.00 mmHg.   8.No pericardial effusion.    ---------------------------------------------------------------------------  -----  FINDINGS:     Left Ventricle:  There is mild concentric left ventricular hypertrophy. Abnormal septal   motion seen due to left bundle branch block. Left ventricular systolic   function is severely reduced with a calculated ejection fraction of 15%   with severe global hypokinesis.     Right Ventricle:  Right ventricular systolic function is myhnhx-ie-cynmpmovvj reduced. The   tricuspidannular plane systolic excursion (TAPSE) is 13.00 mm (normal   >=17 mm). The right ventricle is not well visualized.     Left Atrium:  The left atrium is mildly dilated.     Right Atrium:  The right atrium is normal in size.     Aortic Valve:  Fibrocalcific tricuspid aortic valve without significant stenosis. There   is no evidence of aortic regurgitation.     Tricuspid Valve:  There is mild tricuspid regurgitation. There is moderate pulmonary   hypertension, pulmonary artery systolic pressure is50.00 mmHg.     Inferior Vena Cava:  The inferior vena cava is dilated (>2.1cm) with abnormal inspiratory   collapse (<50%) consistent with elevated right atrial pressure (  15, range 10-20mmHg).     Pulmonic Valve:  The pulmonic valve is not well visualized. There is mild pulmonic   regurgitation.     Aorta:  The aortic root is normal in size.     Pericardium:  No pericardial effusion is seen.     ---------------------------------------------------------------------------  -----  2D AND M-MODE MEASUREMENTS (normal ranges within parentheses):     Left Ventricle:         Normal - Men Normal - Women  IVSd (2D):      1.22 cm  (0.6-1.0)     (0.6-0.9)  LVPWd (2D):     1.29 cm  (0.6-1.0)     (0.6-0.9)  LVIDd (2D):     4.82 cm  (4.2-5.8)     (3.8-5.2)  LVIDs (2D):     4.38 cm  LV FS (2D):      9.1 %     (>25%)  LV EF (MOD BP):  25 %      (>55%)  Aorta/Left Atrium:          Normal  Aortic Root (2D):  2.54 cm (2.4-3.7)  Left Atrium (2D)   4.45 cm (1.9-4.0)     Right Ventricle:    LV DIASTOLIC FUNCTION:     MV Peak E: 93.60 cm/s MV e' lat: 4.1 cm/s MV E/e' lat ratio: 22.9                        MV e' med: 5.0 cm/s MV E/e' med ratio: 18.6    SPECTRAL DOPPLER ANALYSIS:     Aortic Valve: AoV Max Genaro:    0.98 m/s AoV Peak PG:   3.87 mmHg AoV Mean   P.10 mmHg  LVOT Vmax:      0.80 m/s LVOT VTI:      0.111 m   LVOT Diameter: 1.70 cm  AoV Area, VMax: 1.85 cm² AoV Area, VTI: 1.81 cm²  AoV Area, Vmn:    Tricuspid Valve and PA/RV Systolic Pressure: TR Max Velocity: 3.01 m/s RA   Pressure: 3 mmHg RVSP/PASP: 39.2 mmHg  TAPSE:           13 mm    RV S':       Thelma Jeffries MD    Electronically signed by Thelma Jeffries MD  Signature Date/Time: 2019/5:46:47 PM         *** Final ***        < end of copied text >

## 2019-12-06 NOTE — PROGRESS NOTE ADULT - PROBLEM SELECTOR PLAN 1
On entresto, lopressor, lasix and hydralazine at home, p/w signs and sxs of fluid overload w/ echo as documented above, still with trace crackles on exam, UOP net -4L.  - Lasix 40mg IVP BID, consider switching to PO tomorrow  - Entresto 24/26 BID  - switched to lopressor 50 mg BID tonight  - Given patient with EF 15% and LBBB on EKG patient would be appropriate for BI-V/CRT On entresto, lopressor, lasix and hydralazine at home, p/w signs and sxs of fluid overload w/ echo as documented above, still with trace crackles on exam, UOP net -4L.  - Lasix 40mg IVP BID  - Entresto 24/26 BID  - switched to lopressor 50 mg BID tonight  - Given patient with EF 15% and LBBB on EKG patient would be appropriate for BI-V/CRT  - Strict I/O's

## 2019-12-06 NOTE — PROGRESS NOTE ADULT - PROBLEM SELECTOR PLAN 6
Distended abdomen w/ small bowel movements endorsing passing gas, small bowel movements, likely 2/2 to urinary retention vs. constipation  - abdominal XR as above, no radiographic evidence of obstruction  - now with good UOP

## 2019-12-06 NOTE — PROGRESS NOTE ADULT - SUBJECTIVE AND OBJECTIVE BOX
Transfer Acceptance: CCU to [facility]: 64F with pmhx of CHF (EF 30% in 2019, now with in house ECHO showing EF 15%) asthma, breast CA (, s/p XRT ) uterine cancer s/p hysterectomy and oophrectomy , CAD (s/p SLADE to RCA 7/10/2014), depression p/w 3-4 wk history progressive SOB/GUTIERREZ, dry cough, and mildly increased lower extremity edema, found to be hypertensive with systolic BPs 200s, and with signs c/w fluid overload on exam in ED. Patient diuresed, placed on bipap, and started nitro gtt. Upon arrival to ccu L radial a line placed, patient switched to nicardipine gtt, weaned of bipap and with good uop. This am patient stable on RA, discontinued nicardipine gtt, started on entresto, 40mg IVP lasix, and increased lopressor dose. Patient now stable enough for step down to cardiac telemetry unit.           Extra collateral and pmhx obtained from PCP and cardiologist. Per PCP office patient non-adherent with meds, has refused catheterization procedure. EKG -sinus bradycardia possibly q waves II,IIII, avF. EKG  NSR @ 77, LAD. Stress test -defects in anteroseptum, mid-distal, anterior wall, EF 36%. TTE 2019-LAE, SAMEER, LVH, EF 30%, mild-mod MR, mild TR, mild pulm HTN, SPAP 45. Outpatient cardiologist recommended cath for patient in past but she has refused.  Per PCP outpatient meds include: ranexa, buspirone, asa, plavix, simvastatin, recently transitioned to entresto in , nexium, toprol XL, mirtazapine, hctz, montelukast, breoelipta.     SUBJECTIVE / INTERVAL HPI: Patient seen and examined at bedside.     VITAL SIGNS:  Vital Signs Last 24 Hrs  T(C): 36.3 (06 Dec 2019 13:48), Max: 37.1 (06 Dec 2019 01:28)  T(F): 97.4 (06 Dec 2019 13:48), Max: 98.7 (06 Dec 2019 01:28)  HR: 108 (06 Dec 2019 16:00) (86 - 122)  BP: 113/91 (06 Dec 2019 16:00) (113/91 - 205/116)  BP(mean): 106 (06 Dec 2019 16:00) (93 - 143)  RR: 27 (06 Dec 2019 16:00) (16 - 37)  SpO2: 92% (06 Dec 2019 16:00) (89% - 99%)  I&O's Summary    05 Dec 2019 07:  -  06 Dec 2019 07:00  --------------------------------------------------------  IN: 852.5 mL / OUT: 5270 mL / NET: -4417.5 mL    06 Dec 2019 07:01  -  06 Dec 2019 17:19  --------------------------------------------------------  IN: 320 mL / OUT: 0 mL / NET: 320 mL        PHYSICAL EXAM:    General: WDWN  HEENT: NC/AT; PERRL, anicteric sclera; MMM  Neck: supple  Cardiovascular: +S1/S2; RRR  Respiratory: CTA B/L; no W/R/R  Gastrointestinal: soft, NT/ND; +BSx4  Extremities: WWP; no edema, clubbing or cyanosis  Vascular: 2+ radial, DP/PT pulses B/L  Neurological: AAOx3; no focal deficits    MEDICATIONS:  MEDICATIONS  (STANDING):  albuterol/ipratropium for Nebulization 3 milliLiter(s) Nebulizer every 6 hours  apixaban 5 milliGRAM(s) Oral every 12 hours  atorvastatin 80 milliGRAM(s) Oral at bedtime  budesonide 160 MICROgram(s)/formoterol 4.5 MICROgram(s) Inhaler 2 Puff(s) Inhalation two times a day  chlorhexidine 2% Cloths 1 Application(s) Topical <User Schedule>  clopidogrel Tablet 75 milliGRAM(s) Oral daily  furosemide   Injectable 40 milliGRAM(s) IV Push every 12 hours  metoprolol tartrate 25 milliGRAM(s) Oral every 12 hours  polyethylene glycol 3350 17 Gram(s) Oral every 12 hours  sacubitril 24 mG/valsartan 26 mG 1 Tablet(s) Oral every 12 hours  senna 1 Tablet(s) Oral every 24 hours    MEDICATIONS  (PRN):      ALLERGIES:  Allergies    No Known Allergies    Intolerances        LABS:                        12.1   10.06 )-----------( 219      ( 06 Dec 2019 04:51 )             37.5     12-    141  |  100  |  18  ----------------------------<  163<H>  3.5   |  26  |  0.85    Ca    9.0      06 Dec 2019 10:37  Phos  3.2     12-  Mg     2.1         TPro  6.5  /  Alb  4.2  /  TBili  1.6<H>  /  DBili  0.5<H>  /  AST  29  /  ALT  27  /  AlkPhos  71  12-05    PT/INR - ( 06 Dec 2019 04:51 )   PT: 23.6 sec;   INR: 2.04          PTT - ( 06 Dec 2019 04:51 )  PTT:31.0 sec  Urinalysis Basic - ( 05 Dec 2019 13:49 )    Color: Yellow / Appearance: Clear / S.020 / pH: x  Gluc: x / Ketone: NEGATIVE  / Bili: Negative / Urobili: 0.2 E.U./dL   Blood: x / Protein: 30 mg/dL / Nitrite: POSITIVE   Leuk Esterase: NEGATIVE / RBC: < 5 /HPF / WBC < 5 /HPF   Sq Epi: x / Non Sq Epi: 0-5 /HPF / Bacteria: Present /HPF      CAPILLARY BLOOD GLUCOSE          RADIOLOGY & ADDITIONAL TESTS: Reviewed. Transfer Acceptance: CCU to [facility]: 64F with pmhx of CHF (EF 30% in 2019, now with in house ECHO showing EF 15%) asthma, breast CA (, s/p XRT ) uterine cancer s/p hysterectomy and oophrectomy , CAD (s/p SLADE to RCA 7/10/2014), depression p/w 3-4 wk history progressive SOB/GUTIERREZ, dry cough, and mildly increased lower extremity edema, found to be hypertensive with systolic BPs 200s, and with signs c/w fluid overload on exam in ED. Patient diuresed, placed on bipap, and started nitro gtt. Upon arrival to ccu L radial a line placed, patient switched to nicardipine gtt, weaned of bipap and with good uop. This am patient stable on RA, discontinued nicardipine gtt, started on entresto, 40mg IVP lasix, and increased lopressor dose. Patient now stable enough for step down to cardiac telemetry unit.           Extra collateral and pmhx obtained from PCP and cardiologist. Per PCP office patient non-adherent with meds, has refused catheterization procedure. EKG -sinus bradycardia possibly q waves II,IIII, avF. EKG  NSR @ 77, LAD. Stress test -defects in anteroseptum, mid-distal, anterior wall, EF 36%. TTE 2019-LAE, SAMEER, LVH, EF 30%, mild-mod MR, mild TR, mild pulm HTN, SPAP 45. Outpatient cardiologist recommended cath for patient in past but she has refused.  Per PCP outpatient meds include: ranexa, buspirone, asa, plavix, simvastatin, recently transitioned to entresto in , nexium, toprol XL, mirtazapine, hctz, montelukast, breoelipta.     SUBJECTIVE / INTERVAL HPI: Patient seen and examined at bedside.     VITAL SIGNS:  Vital Signs Last 24 Hrs  T(C): 36.3 (06 Dec 2019 13:48), Max: 37.1 (06 Dec 2019 01:28)  T(F): 97.4 (06 Dec 2019 13:48), Max: 98.7 (06 Dec 2019 01:28)  HR: 108 (06 Dec 2019 16:00) (86 - 122)  BP: 113/91 (06 Dec 2019 16:00) (113/91 - 205/116)  BP(mean): 106 (06 Dec 2019 16:00) (93 - 143)  RR: 27 (06 Dec 2019 16:00) (16 - 37)  SpO2: 92% (06 Dec 2019 16:00) (89% - 99%)  I&O's Summary    05 Dec 2019 07:  -  06 Dec 2019 07:00  --------------------------------------------------------  IN: 852.5 mL / OUT: 5270 mL / NET: -4417.5 mL    06 Dec 2019 07:01  -  06 Dec 2019 17:19  --------------------------------------------------------  IN: 320 mL / OUT: 0 mL / NET: 320 mL        PHYSICAL EXAM:    General: WDWN, NAD, speaking in full sentences on 3L NC  HEENT: MMM  Neck: no JVD  Cardiovascular: +S1/S2; tachy, irregular, no m/r/g  Respiratory: Trace bibasilar crackles  Gastrointestinal: soft, NT/ND; +BS4  Extremities: WWP; bilateral trace pitting edema  Vascular: 2+ radial, DP pulses B/L  Neurological: AAOx3; no focal deficits    MEDICATIONS:  MEDICATIONS  (STANDING):  albuterol/ipratropium for Nebulization 3 milliLiter(s) Nebulizer every 6 hours  apixaban 5 milliGRAM(s) Oral every 12 hours  atorvastatin 80 milliGRAM(s) Oral at bedtime  budesonide 160 MICROgram(s)/formoterol 4.5 MICROgram(s) Inhaler 2 Puff(s) Inhalation two times a day  chlorhexidine 2% Cloths 1 Application(s) Topical <User Schedule>  clopidogrel Tablet 75 milliGRAM(s) Oral daily  furosemide   Injectable 40 milliGRAM(s) IV Push every 12 hours  metoprolol tartrate 25 milliGRAM(s) Oral every 12 hours  polyethylene glycol 3350 17 Gram(s) Oral every 12 hours  sacubitril 24 mG/valsartan 26 mG 1 Tablet(s) Oral every 12 hours  senna 1 Tablet(s) Oral every 24 hours    MEDICATIONS  (PRN):      ALLERGIES:  Allergies    No Known Allergies    Intolerances        LABS:                        12.1   10.06 )-----------( 219      ( 06 Dec 2019 04:51 )             37.5     12-    141  |  100  |  18  ----------------------------<  163<H>  3.5   |  26  |  0.85    Ca    9.0      06 Dec 2019 10:37  Phos  3.2     12-  Mg     2.1     12    TPro  6.5  /  Alb  4.2  /  TBili  1.6<H>  /  DBili  0.5<H>  /  AST  29  /  ALT  27  /  AlkPhos  71  12-    PT/INR - ( 06 Dec 2019 04:51 )   PT: 23.6 sec;   INR: 2.04          PTT - ( 06 Dec 2019 04:51 )  PTT:31.0 sec  Urinalysis Basic - ( 05 Dec 2019 13:49 )    Color: Yellow / Appearance: Clear / S.020 / pH: x  Gluc: x / Ketone: NEGATIVE  / Bili: Negative / Urobili: 0.2 E.U./dL   Blood: x / Protein: 30 mg/dL / Nitrite: POSITIVE   Leuk Esterase: NEGATIVE / RBC: < 5 /HPF / WBC < 5 /HPF   Sq Epi: x / Non Sq Epi: 0-5 /HPF / Bacteria: Present /HPF      CAPILLARY BLOOD GLUCOSE          RADIOLOGY & ADDITIONAL TESTS: Reviewed.

## 2019-12-06 NOTE — PROGRESS NOTE ADULT - PROBLEM SELECTOR PLAN 5
patient weaned off of bipap to nasal cannula, now on 3L NC  - likely 2/2 to CHF exacerbation plan as above, c/w diureses  - Plan to wean off NC    #Asthma  Likely not in exacerbation, as no increase mucous production, SOB chronic, not overtly wheezy on exam and with good air movement  - duonebs q6 hrs  - symbicort BID

## 2019-12-07 ENCOUNTER — TRANSCRIPTION ENCOUNTER (OUTPATIENT)
Age: 64
End: 2019-12-07

## 2019-12-07 VITALS
DIASTOLIC BLOOD PRESSURE: 70 MMHG | OXYGEN SATURATION: 95 % | SYSTOLIC BLOOD PRESSURE: 140 MMHG | RESPIRATION RATE: 18 BRPM

## 2019-12-07 LAB
-  AMPICILLIN/SULBACTAM: SIGNIFICANT CHANGE UP
-  AMPICILLIN: SIGNIFICANT CHANGE UP
-  CEFAZOLIN: SIGNIFICANT CHANGE UP
-  CEFTRIAXONE: SIGNIFICANT CHANGE UP
-  GENTAMICIN: SIGNIFICANT CHANGE UP
-  NITROFURANTOIN: SIGNIFICANT CHANGE UP
-  PIPERACILLIN/TAZOBACTAM: SIGNIFICANT CHANGE UP
-  TOBRAMYCIN: SIGNIFICANT CHANGE UP
-  TRIMETHOPRIM/SULFAMETHOXAZOLE: SIGNIFICANT CHANGE UP
ANION GAP SERPL CALC-SCNC: 12 MMOL/L — SIGNIFICANT CHANGE UP (ref 5–17)
APTT BLD: 30.7 SEC — SIGNIFICANT CHANGE UP (ref 27.5–36.3)
BUN SERPL-MCNC: 22 MG/DL — SIGNIFICANT CHANGE UP (ref 7–23)
CALCIUM SERPL-MCNC: 8.9 MG/DL — SIGNIFICANT CHANGE UP (ref 8.4–10.5)
CHLORIDE SERPL-SCNC: 101 MMOL/L — SIGNIFICANT CHANGE UP (ref 96–108)
CO2 SERPL-SCNC: 27 MMOL/L — SIGNIFICANT CHANGE UP (ref 22–31)
CREAT SERPL-MCNC: 0.89 MG/DL — SIGNIFICANT CHANGE UP (ref 0.5–1.3)
CULTURE RESULTS: SIGNIFICANT CHANGE UP
GLUCOSE SERPL-MCNC: 132 MG/DL — HIGH (ref 70–99)
HCT VFR BLD CALC: 43.2 % — SIGNIFICANT CHANGE UP (ref 34.5–45)
HGB BLD-MCNC: 14 G/DL — SIGNIFICANT CHANGE UP (ref 11.5–15.5)
INR BLD: 2.24 — HIGH (ref 0.88–1.16)
MAGNESIUM SERPL-MCNC: 2.1 MG/DL — SIGNIFICANT CHANGE UP (ref 1.6–2.6)
MCHC RBC-ENTMCNC: 31.5 PG — SIGNIFICANT CHANGE UP (ref 27–34)
MCHC RBC-ENTMCNC: 32.4 GM/DL — SIGNIFICANT CHANGE UP (ref 32–36)
MCV RBC AUTO: 97.1 FL — SIGNIFICANT CHANGE UP (ref 80–100)
METHOD TYPE: SIGNIFICANT CHANGE UP
NRBC # BLD: 0 /100 WBCS — SIGNIFICANT CHANGE UP (ref 0–0)
ORGANISM # SPEC MICROSCOPIC CNT: SIGNIFICANT CHANGE UP
ORGANISM # SPEC MICROSCOPIC CNT: SIGNIFICANT CHANGE UP
PLATELET # BLD AUTO: 259 K/UL — SIGNIFICANT CHANGE UP (ref 150–400)
POTASSIUM SERPL-MCNC: 3.6 MMOL/L — SIGNIFICANT CHANGE UP (ref 3.5–5.3)
POTASSIUM SERPL-SCNC: 3.6 MMOL/L — SIGNIFICANT CHANGE UP (ref 3.5–5.3)
PROTHROM AB SERPL-ACNC: 26 SEC — HIGH (ref 10–12.9)
RBC # BLD: 4.45 M/UL — SIGNIFICANT CHANGE UP (ref 3.8–5.2)
RBC # FLD: 13.3 % — SIGNIFICANT CHANGE UP (ref 10.3–14.5)
SODIUM SERPL-SCNC: 140 MMOL/L — SIGNIFICANT CHANGE UP (ref 135–145)
SPECIMEN SOURCE: SIGNIFICANT CHANGE UP
WBC # BLD: 10.46 K/UL — SIGNIFICANT CHANGE UP (ref 3.8–10.5)
WBC # FLD AUTO: 10.46 K/UL — SIGNIFICANT CHANGE UP (ref 3.8–10.5)

## 2019-12-07 PROCEDURE — 87186 SC STD MICRODIL/AGAR DIL: CPT

## 2019-12-07 PROCEDURE — 84100 ASSAY OF PHOSPHORUS: CPT

## 2019-12-07 PROCEDURE — 71045 X-RAY EXAM CHEST 1 VIEW: CPT

## 2019-12-07 PROCEDURE — 71045 X-RAY EXAM CHEST 1 VIEW: CPT | Mod: 26

## 2019-12-07 PROCEDURE — 85027 COMPLETE CBC AUTOMATED: CPT

## 2019-12-07 PROCEDURE — 93005 ELECTROCARDIOGRAM TRACING: CPT

## 2019-12-07 PROCEDURE — 87086 URINE CULTURE/COLONY COUNT: CPT

## 2019-12-07 PROCEDURE — 83690 ASSAY OF LIPASE: CPT

## 2019-12-07 PROCEDURE — 82553 CREATINE MB FRACTION: CPT

## 2019-12-07 PROCEDURE — 94640 AIRWAY INHALATION TREATMENT: CPT

## 2019-12-07 PROCEDURE — 84443 ASSAY THYROID STIM HORMONE: CPT

## 2019-12-07 PROCEDURE — 83880 ASSAY OF NATRIURETIC PEPTIDE: CPT

## 2019-12-07 PROCEDURE — 84484 ASSAY OF TROPONIN QUANT: CPT

## 2019-12-07 PROCEDURE — 97161 PT EVAL LOW COMPLEX 20 MIN: CPT

## 2019-12-07 PROCEDURE — 87486 CHLMYD PNEUM DNA AMP PROBE: CPT

## 2019-12-07 PROCEDURE — 87581 M.PNEUMON DNA AMP PROBE: CPT

## 2019-12-07 PROCEDURE — 81001 URINALYSIS AUTO W/SCOPE: CPT

## 2019-12-07 PROCEDURE — 99239 HOSP IP/OBS DSCHRG MGMT >30: CPT

## 2019-12-07 PROCEDURE — 82248 BILIRUBIN DIRECT: CPT

## 2019-12-07 PROCEDURE — 85610 PROTHROMBIN TIME: CPT

## 2019-12-07 PROCEDURE — 83036 HEMOGLOBIN GLYCOSYLATED A1C: CPT

## 2019-12-07 PROCEDURE — 93306 TTE W/DOPPLER COMPLETE: CPT

## 2019-12-07 PROCEDURE — 87798 DETECT AGENT NOS DNA AMP: CPT

## 2019-12-07 PROCEDURE — 86803 HEPATITIS C AB TEST: CPT

## 2019-12-07 PROCEDURE — 36415 COLL VENOUS BLD VENIPUNCTURE: CPT

## 2019-12-07 PROCEDURE — 83605 ASSAY OF LACTIC ACID: CPT

## 2019-12-07 PROCEDURE — 74019 RADEX ABDOMEN 2 VIEWS: CPT

## 2019-12-07 PROCEDURE — 85730 THROMBOPLASTIN TIME PARTIAL: CPT

## 2019-12-07 PROCEDURE — 87633 RESP VIRUS 12-25 TARGETS: CPT

## 2019-12-07 PROCEDURE — 80053 COMPREHEN METABOLIC PANEL: CPT

## 2019-12-07 PROCEDURE — 96374 THER/PROPH/DIAG INJ IV PUSH: CPT

## 2019-12-07 PROCEDURE — 99285 EMERGENCY DEPT VISIT HI MDM: CPT | Mod: 25

## 2019-12-07 PROCEDURE — 80048 BASIC METABOLIC PNL TOTAL CA: CPT

## 2019-12-07 PROCEDURE — 85379 FIBRIN DEGRADATION QUANT: CPT

## 2019-12-07 PROCEDURE — 94660 CPAP INITIATION&MGMT: CPT

## 2019-12-07 PROCEDURE — 82550 ASSAY OF CK (CPK): CPT

## 2019-12-07 PROCEDURE — 80061 LIPID PANEL: CPT

## 2019-12-07 PROCEDURE — 96375 TX/PRO/DX INJ NEW DRUG ADDON: CPT

## 2019-12-07 PROCEDURE — 82803 BLOOD GASES ANY COMBINATION: CPT

## 2019-12-07 PROCEDURE — 85025 COMPLETE CBC W/AUTO DIFF WBC: CPT

## 2019-12-07 PROCEDURE — 83735 ASSAY OF MAGNESIUM: CPT

## 2019-12-07 RX ORDER — SIMVASTATIN 20 MG/1
0 TABLET, FILM COATED ORAL
Qty: 30 | Refills: 0 | DISCHARGE

## 2019-12-07 RX ORDER — SACUBITRIL AND VALSARTAN 24; 26 MG/1; MG/1
1 TABLET, FILM COATED ORAL
Qty: 60 | Refills: 1
Start: 2019-12-07 | End: 2020-02-04

## 2019-12-07 RX ORDER — RIVASTIGMINE 4.6 MG/24H
0 PATCH, EXTENDED RELEASE TRANSDERMAL
Qty: 30 | Refills: 0 | DISCHARGE

## 2019-12-07 RX ORDER — FUROSEMIDE 40 MG
1 TABLET ORAL
Qty: 60 | Refills: 0
Start: 2019-12-07 | End: 2020-01-05

## 2019-12-07 RX ORDER — CLOPIDOGREL BISULFATE 75 MG/1
1 TABLET, FILM COATED ORAL
Qty: 30 | Refills: 1
Start: 2019-12-07 | End: 2020-02-04

## 2019-12-07 RX ORDER — TACROLIMUS/VEHICLE BASE NO.238 0.1 %
0 CREAM (GRAM) TOPICAL
Qty: 100 | Refills: 0 | DISCHARGE

## 2019-12-07 RX ORDER — FUROSEMIDE 40 MG
0 TABLET ORAL
Qty: 30 | Refills: 0 | DISCHARGE

## 2019-12-07 RX ORDER — SACUBITRIL AND VALSARTAN 24; 26 MG/1; MG/1
0 TABLET, FILM COATED ORAL
Qty: 60 | Refills: 0 | DISCHARGE

## 2019-12-07 RX ORDER — BACITRACIN ZINC 500 UNIT/G
1 OINTMENT IN PACKET (EA) TOPICAL ONCE
Refills: 0 | Status: DISCONTINUED | OUTPATIENT
Start: 2019-12-07 | End: 2019-12-07

## 2019-12-07 RX ORDER — APIXABAN 2.5 MG/1
5 TABLET, FILM COATED ORAL EVERY 12 HOURS
Refills: 0 | Status: DISCONTINUED | OUTPATIENT
Start: 2019-12-07 | End: 2019-12-07

## 2019-12-07 RX ORDER — VALSARTAN 80 MG/1
0 TABLET ORAL
Qty: 30 | Refills: 0 | DISCHARGE

## 2019-12-07 RX ORDER — LIDOCAINE 4 G/100G
0 CREAM TOPICAL
Qty: 30 | Refills: 0 | DISCHARGE

## 2019-12-07 RX ORDER — APIXABAN 2.5 MG/1
1 TABLET, FILM COATED ORAL
Qty: 60 | Refills: 1
Start: 2019-12-07 | End: 2020-02-04

## 2019-12-07 RX ORDER — RIFAXIMIN 200 MG/1
0 TABLET ORAL
Qty: 60 | Refills: 0 | DISCHARGE

## 2019-12-07 RX ORDER — FUROSEMIDE 40 MG
40 TABLET ORAL EVERY 12 HOURS
Refills: 0 | Status: DISCONTINUED | OUTPATIENT
Start: 2019-12-07 | End: 2019-12-07

## 2019-12-07 RX ORDER — ATORVASTATIN CALCIUM 80 MG/1
1 TABLET, FILM COATED ORAL
Qty: 30 | Refills: 1
Start: 2019-12-07 | End: 2020-02-04

## 2019-12-07 RX ORDER — POTASSIUM CHLORIDE 20 MEQ
40 PACKET (EA) ORAL ONCE
Refills: 0 | Status: COMPLETED | OUTPATIENT
Start: 2019-12-07 | End: 2019-12-07

## 2019-12-07 RX ADMIN — Medication 50 MILLIGRAM(S): at 06:09

## 2019-12-07 RX ADMIN — Medication 40 MILLIEQUIVALENT(S): at 09:57

## 2019-12-07 RX ADMIN — APIXABAN 5 MILLIGRAM(S): 2.5 TABLET, FILM COATED ORAL at 06:09

## 2019-12-07 RX ADMIN — CLOPIDOGREL BISULFATE 75 MILLIGRAM(S): 75 TABLET, FILM COATED ORAL at 09:57

## 2019-12-07 RX ADMIN — Medication 40 MILLIGRAM(S): at 06:10

## 2019-12-07 RX ADMIN — Medication 3 MILLILITER(S): at 09:57

## 2019-12-07 RX ADMIN — SACUBITRIL AND VALSARTAN 1 TABLET(S): 24; 26 TABLET, FILM COATED ORAL at 09:57

## 2019-12-07 NOTE — DISCHARGE NOTE NURSING/CASE MANAGEMENT/SOCIAL WORK - PATIENT PORTAL LINK FT
You can access the FollowMyHealth Patient Portal offered by [facility] by registering at the following website: [email or web address]. By joining [facility]’s FollowMyHealth portal, you will also be able to view your health information using other applications (apps) compatible with our system.

## 2019-12-07 NOTE — PROGRESS NOTE ADULT - PROBLEM SELECTOR PROBLEM 3
Afib
Afib
Mother  Still living? Unknown  Family history of CHF (congestive heart failure), Age at diagnosis: Age Unknown

## 2019-12-07 NOTE — DISCHARGE NOTE PROVIDER - NSDCFUADDAPPT_GEN_ALL_CORE_FT
Please follow-up with your cardiologist on Monday morning, discussing an outpatient cardiac catheterization procedure as your Ejection Fraction has dropped from 30% to 15%.

## 2019-12-07 NOTE — CHART NOTE - NSCHARTNOTEFT_GEN_A_CORE
Eliquis was dced and patient was started on heparin gtt tonight at 9PM in preparation for cardiac cath. After heparin was started, pt had RUE IV removed by nurse because it was no longer flushing. That site then began to bleed and despite multiple attempts by nurses to temper the bleeding, pt saturated multiple bandages. Nurse called me and I instructed him to hold heparin for an hour and to hold pressure on the site until bleeding stops. Bleeding stopped and when nurse went to restart heparin, patient refused, stating that she did not want to bleed again. I came to bedside to explain to the patient the importance of being on a blood thinner given her Afib and in preparation for cardiac cath. Patient repeatedly refused stating that she did not want to bleed. She also stated that she did not want the procedure and she wants to go home. I told her that if that was the case, I can dc the heparin but I would like for her to take Eliquis. She then refused the Eliquis, stating that she was taking too many pills. I explained to the her the dangers of being off of a blood thinner and that she is at risk of stroke if she does not accept it. She stated that she would take it in the morning but does not want to take Eliquis now. I dced heparin and ordered Eliquis for AM dose. Team will continue to closely observe the patient over night.

## 2019-12-07 NOTE — DISCHARGE NOTE PROVIDER - NSDCMRMEDTOKEN_GEN_ALL_CORE_FT
AMITIZA 24MCG CAPSULE:   apixaban 5 mg oral tablet: 1 tab(s) orally every 12 hours  atorvastatin 80 mg oral tablet: 1 tab(s) orally once a day (at bedtime)  BREO ELLIPTA AEROSOL POWDER BREATH ACTIVATED:   clopidogrel 75 mg oral tablet: 1 tab(s) orally once a day  DEXILANT 60MG CAPSULE DELAYED RELEASE:   Entresto 24 mg-26 mg oral tablet: 1 tab(s) orally every 12 hours  GABAPENTIN 100MG CAPSULE:   Lasix 40 mg oral tablet: 1 tab(s) orally every 12 hours  MELOXICAM 15MG TABLET:   Metoprolol Tartrate 50 mg oral tablet: 1 tab(s) orally 2 times a day  MYRBETRIQ 50MG TABLET EXTENDED RELEASE 24 HOUR:

## 2019-12-07 NOTE — PROGRESS NOTE ADULT - ASSESSMENT
64F w/ extensive CHF, CAD s/p stents 2015 at [facility] who presented with 1 month progressive GUTIERREZ, dry cough, and increasing LE extremity edema, and acute onset radiating 7/10 chest pressure BIBEMS, found to be hypertensive to SBPs 200s w/ pulmonary edema consistent with signs of fluid overload, EKG w/ afib and LBBB no ischemic changes, CXR with moderate pulmonary congestion, and echo with reduced EF of 15%. Patient likely 2/2 to Acute on chronic exacerbation of heart failure exacerbated now 2/2 to elevated systolic BPs likely due to non-adherence w/ medication.

## 2019-12-07 NOTE — DISCHARGE NOTE PROVIDER - HOSPITAL COURSE
Hospital Course: 64F with pmhx of CHF (EF 30% in june 2019, now with in house ECHO showing EF 15%) asthma, breast CA (2014, s/p XRT 2014) uterine cancer s/p hysterectomy and oophrectomy , CAD (s/p SLADE to RCA 7/10/2014), depression p/w 3-4 wk history progressive SOB/GUTIERREZ, dry cough, and mildly increased lower extremity edema, found to be hypertensive with systolic BPs 200s, and with signs c/w fluid overload on exam in ED. Patient diuresed, placed on bipap, and started nitro gtt. Upon arrival to ccu L radial a line placed, patient switched to nicardipine gtt, weaned of bipap and with good uop. Discontinued nicardipine gtt, started on entresto, 40mg IVP lasix, and increased lopressor dose to 50mg. Patient started on heparin gtt for urgent cardiac catheterization; however, the patient refused heparin as she did not want to bleed. She then declined the cardiac catheterization as she does not want to wait in the hospital. We explained the urgency of the cardiac catheterization with her EF decreasing from 30% to 15% in a short time period; and the patient persisted in stating she will have it done another time by her cardiologist. We spoke to the son as well, explaining the necessity of the cardiac cath, and the risks of his mother not having the procedure, and he agreed with his mom leaving the hospital AMA. He says he will ensure she takes her medications and that she sees her cardiologist early next week. After discussing the situation with the son and the patient, the patient and family understands the risks of leaving the hospital without the catheterization which include death, They explained her condition as well as the risks involved with leaving AMA back to me. Patient signed AMA and medications will be sent to her pharmacy. Hospital Course: 64F with pmhx of CHF (EF 30% in june 2019, now with in house ECHO showing EF 15%), afib, asthma, breast CA (2014, s/p XRT 2014) uterine cancer s/p hysterectomy and oophrectomy , CAD (s/p SLADE to RCA 7/10/2014), depression p/w 3-4 wk history progressive SOB/GUTIERREZ, dry cough, and mildly increased lower extremity edema, found to be hypertensive with systolic BPs 200s, and with signs c/w fluid overload on exam in ED. Patient diuresed, placed on bipap, and started nitro gtt. Upon arrival to ccu L radial a line placed, patient switched to nicardipine gtt, weaned of bipap and with good uop. Discontinued nicardipine gtt, started on entresto, 40mg IVP lasix, and increased lopressor dose to 50mg. Discussed with patient benefit of cardiac catheterization given the new drop in LVEF.  Pt refused and said she wants to go home now. She does not want to wait in the hospital. Patient said she will have it done "another time". We spoke to the son as well who agreed with his Mother's decision to leave.  He says he will ensure she takes her medications and that she sees her cardiologist early next week. After discussing the situation with the son and the patient, the patient and family understands the risks of leaving the hospital while in acute heart failure and without a coronary evaluation.  Patient signed AMA and medications will be sent to her pharmacy.

## 2019-12-07 NOTE — PROGRESS NOTE ADULT - SUBJECTIVE AND OBJECTIVE BOX
OVERNIGHT EVENTS: Patient refused Bipap overnight. IV with heparin gtt stopped flushing and was removed. Site then started bleeding, and pressure was held. it resolved within 1 hour. Patient then refused for the continuation of heparin as she did not want to bleed. Dr. Aguero explained the importance of the heparin gtt in order to get the cardiac catheterization and the patient continued to refuse, stating she wants to go home. She then refused to take her eliquis, but agreed to take it in the AM. She also had 15 beats of Vtach this morning, she was asymptomatic.     SUBJECTIVE / INTERVAL HPI: Patient seen and examined at bedside. Patient says her chest pain and SOB have resolved from her admission. She says she wants to go home today. I explained the importance of the cardiac catheterization, and that her EF is 15%, and she continuously states she does not want the procedure and she wants to leave. Patient urinated overnight, and s tolerating PO. ROS otherwise negative.      VITAL SIGNS:  Vital Signs Last 24 Hrs  T(C): 36.8 (07 Dec 2019 09:28), Max: 37.7 (06 Dec 2019 17:00)  T(F): 98.3 (07 Dec 2019 09:28), Max: 99.8 (06 Dec 2019 17:00)  HR: 96 (07 Dec 2019 10:06) (96 - 124)  BP: 146/76 (07 Dec 2019 10:06) (113/91 - 155/86)  BP(mean): 103 (07 Dec 2019 05:24) (94 - 127)  RR: 18 (07 Dec 2019 10:06) (18 - 27)  SpO2: 94% (07 Dec 2019 10:06) (91% - 96%)  I&O's Summary    06 Dec 2019 07:01  -  07 Dec 2019 07:00  --------------------------------------------------------  IN: 362 mL / OUT: 0 mL / NET: 362 mL    07 Dec 2019 07:01  -  07 Dec 2019 11:22  --------------------------------------------------------  IN: 237 mL / OUT: 0 mL / NET: 237 mL        PHYSICAL EXAM:    General: WDWN, NAD, speaking in full sentences on 3L NC  HEENT: MMM  Neck: no JVD  Cardiovascular: +S1/S2; tachy, irregular, no m/r/g  Respiratory: Trace bibasilar crackles  Gastrointestinal: soft, NT/ND; +BS4  Extremities: WWP; bilateral trace pitting edema  Vascular: 2+ radial, DP pulses B/L  Neurological: AAOx3; no focal deficits    MEDICATIONS:  MEDICATIONS  (STANDING):  albuterol/ipratropium for Nebulization 3 milliLiter(s) Nebulizer every 6 hours  apixaban 5 milliGRAM(s) Oral every 12 hours  atorvastatin 80 milliGRAM(s) Oral at bedtime  BACItracin   Ointment 1 Application(s) Topical once  budesonide 160 MICROgram(s)/formoterol 4.5 MICROgram(s) Inhaler 2 Puff(s) Inhalation two times a day  chlorhexidine 2% Cloths 1 Application(s) Topical <User Schedule>  clopidogrel Tablet 75 milliGRAM(s) Oral daily  furosemide   Injectable 40 milliGRAM(s) IV Push every 12 hours  metoprolol tartrate 50 milliGRAM(s) Oral every 12 hours  polyethylene glycol 3350 17 Gram(s) Oral every 12 hours  sacubitril 24 mG/valsartan 26 mG 1 Tablet(s) Oral every 12 hours  senna 1 Tablet(s) Oral every 24 hours    MEDICATIONS  (PRN):      ALLERGIES:  Allergies    No Known Allergies    Intolerances        LABS:                        14.0   10.46 )-----------( 259      ( 07 Dec 2019 08:06 )             43.2     12-07    140  |  101  |  22  ----------------------------<  132<H>  3.6   |  27  |  0.89    Ca    8.9      07 Dec 2019 08:06  Phos  3.2     12-06  Mg     2.1     12-07    TPro  6.5  /  Alb  4.2  /  TBili  1.6<H>  /  DBili  0.5<H>  /  AST  29  /  ALT  27  /  AlkPhos  71  12-05    PT/INR - ( 07 Dec 2019 08:06 )   PT: 26.0 sec;   INR: 2.24          PTT - ( 07 Dec 2019 08:06 )  PTT:30.7 sec  Urinalysis Basic - ( 05 Dec 2019 13:49 )    Color: Yellow / Appearance: Clear / S.020 / pH: x  Gluc: x / Ketone: NEGATIVE  / Bili: Negative / Urobili: 0.2 E.U./dL   Blood: x / Protein: 30 mg/dL / Nitrite: POSITIVE   Leuk Esterase: NEGATIVE / RBC: < 5 /HPF / WBC < 5 /HPF   Sq Epi: x / Non Sq Epi: 0-5 /HPF / Bacteria: Present /HPF      CAPILLARY BLOOD GLUCOSE          RADIOLOGY & ADDITIONAL TESTS: Reviewed.

## 2019-12-07 NOTE — PROGRESS NOTE ADULT - PROBLEM SELECTOR PLAN 1
On entresto, lopressor, lasix and hydralazine at home, p/w signs and sxs of fluid overload w/ EF of 15%.  - Lasix 40mg IVP BID  - Entresto 24/26 BID  - c/w lopressor 50 mg BID tonight  - Given patient with EF 15% and LBBB on EKG patient would be appropriate for BI-V/CRT  - Strict I/O's

## 2019-12-07 NOTE — DISCHARGE NOTE NURSING/CASE MANAGEMENT/SOCIAL WORK - NSDCPEELIQUIS_GEN_ALL_CORE
Apixaban/Eliquis - Potential for adverse drug reactions and interactions/Apixaban/Eliquis - Compliance/Apixaban/Eliquis - Follow up monitoring/Apixaban/Eliquis - Dietary Advice

## 2019-12-07 NOTE — PHYSICAL THERAPY INITIAL EVALUATION ADULT - PERTINENT HX OF CURRENT PROBLEM, REHAB EVAL
64 year old female p/w 3-4 wk history progressive SOB/GUTIERREZ, dry cough, and mildly increased lower extremity edema, per patient in usual state of health until sxs developed 1 month ago, associated with palpitations, however no orthopnea or PND. Now w/ 1 day of acute onset, non-exertional 7/10 mid sternal chest pressure at rest radiating to R arm.

## 2019-12-07 NOTE — PROGRESS NOTE ADULT - PROBLEM SELECTOR PLAN 4
SLADE to RCA 7/10/2014. Patient originally on triple therapy.   - c/w plavix 75  - Patient refused heparin gtt, and is currently back on eliquis regimen.   - Plan for Cath after weekend as EF has decreased from 30% in June to 15%; however, patient is currently refusing at this time.

## 2019-12-07 NOTE — PROGRESS NOTE ADULT - PROBLEM SELECTOR PLAN 5
patient did not use Bipap overnight, now on 3L NC  - likely 2/2 to CHF exacerbation plan as above, c/w diureses  - Plan to wean off NC    #Asthma  Likely not in exacerbation, as no increase mucous production, SOB chronic, not overtly wheezy on exam and with good air movement  - duonebs q6 hrs  - symbicort BID

## 2019-12-07 NOTE — PROGRESS NOTE ADULT - PROBLEM SELECTOR PLAN 3
EKG on admission with afib and LBBB, no ischemic changes  - lopressor 50 BID  - CHADS VASC >2  - transition from eliquis to heparin gtt tonight.

## 2019-12-07 NOTE — PHYSICAL THERAPY INITIAL EVALUATION ADULT - ADDITIONAL COMMENTS
Pt lives alone reports not having home assistance, denies prior use of DME, denies hx of falls, has 4 steps to enter, states she has been independent PTA with ability to ambulate 4-5 blocks at baseline. Upon discussion with PRATIK Zimmerman, pt has HHA 7 days for 4 hours.

## 2019-12-11 DIAGNOSIS — Z91.14 PATIENT'S OTHER NONCOMPLIANCE WITH MEDICATION REGIMEN: ICD-10-CM

## 2019-12-11 DIAGNOSIS — J45.909 UNSPECIFIED ASTHMA, UNCOMPLICATED: ICD-10-CM

## 2019-12-11 DIAGNOSIS — I25.10 ATHEROSCLEROTIC HEART DISEASE OF NATIVE CORONARY ARTERY WITHOUT ANGINA PECTORIS: ICD-10-CM

## 2019-12-11 DIAGNOSIS — J96.01 ACUTE RESPIRATORY FAILURE WITH HYPOXIA: ICD-10-CM

## 2019-12-11 DIAGNOSIS — K59.00 CONSTIPATION, UNSPECIFIED: ICD-10-CM

## 2019-12-11 DIAGNOSIS — Z90.710 ACQUIRED ABSENCE OF BOTH CERVIX AND UTERUS: ICD-10-CM

## 2019-12-11 DIAGNOSIS — I16.1 HYPERTENSIVE EMERGENCY: ICD-10-CM

## 2019-12-11 DIAGNOSIS — I11.0 HYPERTENSIVE HEART DISEASE WITH HEART FAILURE: ICD-10-CM

## 2019-12-11 DIAGNOSIS — I50.23 ACUTE ON CHRONIC SYSTOLIC (CONGESTIVE) HEART FAILURE: ICD-10-CM

## 2019-12-11 DIAGNOSIS — I48.11 LONGSTANDING PERSISTENT ATRIAL FIBRILLATION: ICD-10-CM

## 2019-12-11 DIAGNOSIS — Z85.3 PERSONAL HISTORY OF MALIGNANT NEOPLASM OF BREAST: ICD-10-CM

## 2019-12-11 DIAGNOSIS — J44.9 CHRONIC OBSTRUCTIVE PULMONARY DISEASE, UNSPECIFIED: ICD-10-CM

## 2020-02-05 PROBLEM — Z00.00 ENCOUNTER FOR PREVENTIVE HEALTH EXAMINATION: Status: ACTIVE | Noted: 2020-02-05

## 2020-02-12 PROBLEM — I25.10 ATHEROSCLEROTIC HEART DISEASE OF NATIVE CORONARY ARTERY WITHOUT ANGINA PECTORIS: Chronic | Status: ACTIVE | Noted: 2019-12-10

## 2020-02-12 PROBLEM — Z98.890 OTHER SPECIFIED POSTPROCEDURAL STATES: Chronic | Status: ACTIVE | Noted: 2019-12-05

## 2020-02-12 PROBLEM — I50.9 HEART FAILURE, UNSPECIFIED: Chronic | Status: ACTIVE | Noted: 2019-12-10

## 2020-02-27 NOTE — HISTORY OF PRESENT ILLNESS
[FreeTextEntry1] : 65 year old female with a history of Afib (on Eliquis), asthma, breast CA (2014, s/p XRT 2014) uterine cancer s/p hysterectomy and oophrectomy , CAD (s/p SLADE to RCA 7/10/2014), depression, and chronic diastolic heart failure who presents for further evaluation of her heart disease.\par \par The patient was recently admitted to [facility] with heart failure and refused a cardiac catherization.\par \par Since discharge,

## 2020-03-02 ENCOUNTER — APPOINTMENT (OUTPATIENT)
Dept: CARDIOTHORACIC SURGERY | Facility: CLINIC | Age: 65
End: 2020-03-02

## 2020-10-07 ENCOUNTER — APPOINTMENT (OUTPATIENT)
Dept: HEART AND VASCULAR | Facility: CLINIC | Age: 65
End: 2020-10-07
Payer: MEDICARE

## 2020-10-07 VITALS
DIASTOLIC BLOOD PRESSURE: 66 MMHG | BODY MASS INDEX: 32.1 KG/M2 | TEMPERATURE: 98.5 F | HEIGHT: 61 IN | WEIGHT: 170 LBS | HEART RATE: 59 BPM | SYSTOLIC BLOOD PRESSURE: 114 MMHG

## 2020-10-07 PROCEDURE — 99214 OFFICE O/P EST MOD 30 MIN: CPT

## 2020-10-07 PROCEDURE — 93306 TTE W/DOPPLER COMPLETE: CPT

## 2020-10-07 PROCEDURE — 93000 ELECTROCARDIOGRAM COMPLETE: CPT

## 2020-10-07 NOTE — ASSESSMENT
[FreeTextEntry1] : 65 F\par \par Acute Systolic Heart Failure - overloaded on exam, GUTIERREZ, orthopneic.  No resp distress, O2 sats wnl.  unclear if compliant with meds given history of leaving AMA.  \par CAD s/p CABG July 2020 \par Afib - in sinus\par EKG: sinus mando, IVCD\par ECHO: severely depressed LVEF in Dec 2019 \par \par - severely overloaded, would benefit from IV diuretics though patient is refusing to go to the hospital\par - increase oral diuretic regimen to torsemide 40mg BID (home dose lasix 40mg bid)\par - continue home meds for now pending labs: eliquis 5mg bid, amiodarone 200mg, asa, lipitor 80mg, entresto 49-51mg, coreg 6.25mg BID\par - CXR\par - ECHO \par - obtain records from [facility]\par - labs

## 2020-10-07 NOTE — REASON FOR VISIT
[FreeTextEntry1] : [facility] Hospital Course 12/5-12/7/2019:\par \par 64F with pmhx of CHF (EF 30% in june 2019, now with in house ECHO showing EF 15%), afib, asthma, breast CA (2014, s/p XRT 2014) uterine cancer s/p hysterectomy and oophrectomy , CAD (s/p SLADE to RCA 7/10/2014), depression p/w 3-4 wk history progressive SOB/GUTIERREZ, dry cough, and mildly increased lower extremity edema, found to be hypertensive with systolic BPs 200s, and with signs c/w fluid overload on exam in ED. Patient diuresed, placed on bipap, and started nitro gtt. Upon arrival to ccu L radial a line placed, patient switched to nicardipine gtt, weaned of bipap and with good uop. Discontinued nicardipine gtt, started on entresto, 40mg IVP lasix, and increased lopressor dose to 50mg. Discussed with patient benefit of cardiac catheterization given the new drop in LVEF.  Pt refused and said she wants to go home now. She does not want to wait in the hospital. Patient said she will have it done "another time". We spoke to the son as well who agreed with his Mother's decision to leave.  He says he will ensure she takes her medications and that she sees her cardiologist early next week. After discussing the situation with the son and the patient, the patient and family understands the risks of leaving the hospital while in acute heart failure and without a coronary evaluation.  Patient signed AMA and medications will be sent to her pharmacy. \par

## 2020-10-07 NOTE — PHYSICAL EXAM
[Heart Rate And Rhythm] : heart rate and rhythm were normal [Heart Sounds] : normal S1 and S2 [Murmurs] : no murmurs present [Arterial Pulses Normal] : the arterial pulses were normal [Abdomen Soft] : soft [Abdomen Tenderness] : non-tender [Abdomen Mass (___ Cm)] : no abdominal mass palpated [Abnormal Walk] : normal gait [Gait - Sufficient For Exercise Testing] : the gait was sufficient for exercise testing [Nail Clubbing] : no clubbing of the fingernails [Cyanosis, Localized] : no localized cyanosis [Petechial Hemorrhages (___cm)] : no petechial hemorrhages [Skin Color & Pigmentation] : normal skin color and pigmentation [] : no rash [No Venous Stasis] : no venous stasis [Skin Lesions] : no skin lesions [No Skin Ulcers] : no skin ulcer [No Xanthoma] : no  xanthoma was observed [FreeTextEntry1] : +

## 2020-10-07 NOTE — HISTORY OF PRESENT ILLNESS
[FreeTextEntry1] : 10/7/20: Since leaving AMA from [facility] in Dec 2019 for acute heart failure. Had CABG July 2020 ago at [facility].  Saw the CT surgeon once post operatively.  Notes her legs are swelling and she has trouble breathing.  has not been back to hospital since July.  Cant walk one block before getting short of breath.  no chest pain.  can lay only on her side, feels SOB when laying flat on her back.  says she is compliant with meds.  \par \par EKG: sinus bradycardia, 1st deg AVB, IVCD\par \par Non smoker\par No etoh\par NKDA \par Fhx: Mother CAD

## 2020-10-08 LAB
25(OH)D3 SERPL-MCNC: 25.7 NG/ML
ALBUMIN SERPL ELPH-MCNC: 4.5 G/DL
ALP BLD-CCNC: 129 U/L
ALT SERPL-CCNC: 19 U/L
ANION GAP SERPL CALC-SCNC: 16 MMOL/L
APTT BLD: 31.3 SEC
AST SERPL-CCNC: 25 U/L
BASOPHILS # BLD AUTO: 0.06 K/UL
BASOPHILS NFR BLD AUTO: 0.9 %
BILIRUB SERPL-MCNC: 1.7 MG/DL
BUN SERPL-MCNC: 25 MG/DL
CALCIUM SERPL-MCNC: 9.3 MG/DL
CHLORIDE SERPL-SCNC: 101 MMOL/L
CHOLEST SERPL-MCNC: 128 MG/DL
CHOLEST/HDLC SERPL: 3.8 RATIO
CO2 SERPL-SCNC: 23 MMOL/L
CREAT SERPL-MCNC: 1.15 MG/DL
EOSINOPHIL # BLD AUTO: 0.14 K/UL
EOSINOPHIL NFR BLD AUTO: 2.1 %
ESTIMATED AVERAGE GLUCOSE: 111 MG/DL
GLUCOSE SERPL-MCNC: 99 MG/DL
HBA1C MFR BLD HPLC: 5.5 %
HCT VFR BLD CALC: 36.6 %
HDLC SERPL-MCNC: 34 MG/DL
HGB BLD-MCNC: 10.4 G/DL
IMM GRANULOCYTES NFR BLD AUTO: 0.3 %
INR PPP: 2.07 RATIO
LDLC SERPL CALC-MCNC: 72 MG/DL
LYMPHOCYTES # BLD AUTO: 1 K/UL
LYMPHOCYTES NFR BLD AUTO: 14.8 %
MAGNESIUM SERPL-MCNC: 2.1 MG/DL
MAN DIFF?: NORMAL
MCHC RBC-ENTMCNC: 26.6 PG
MCHC RBC-ENTMCNC: 28.4 GM/DL
MCV RBC AUTO: 93.6 FL
MONOCYTES # BLD AUTO: 0.63 K/UL
MONOCYTES NFR BLD AUTO: 9.3 %
NEUTROPHILS # BLD AUTO: 4.91 K/UL
NEUTROPHILS NFR BLD AUTO: 72.6 %
NT-PROBNP SERPL-MCNC: 4114 PG/ML
PLATELET # BLD AUTO: 205 K/UL
POTASSIUM SERPL-SCNC: 3.4 MMOL/L
PROT SERPL-MCNC: 6.7 G/DL
PT BLD: 23.6 SEC
RBC # BLD: 3.91 M/UL
RBC # FLD: 19.4 %
SODIUM SERPL-SCNC: 140 MMOL/L
TRIGL SERPL-MCNC: 109 MG/DL
TSH SERPL-ACNC: 2.79 UIU/ML
WBC # FLD AUTO: 6.76 K/UL

## 2020-10-11 ENCOUNTER — OUTPATIENT (OUTPATIENT)
Dept: OUTPATIENT SERVICES | Facility: HOSPITAL | Age: 65
LOS: 1 days | End: 2020-10-11
Payer: MEDICARE

## 2020-10-11 DIAGNOSIS — Z90.710 ACQUIRED ABSENCE OF BOTH CERVIX AND UTERUS: Chronic | ICD-10-CM

## 2020-10-11 PROCEDURE — 71046 X-RAY EXAM CHEST 2 VIEWS: CPT

## 2020-10-11 PROCEDURE — 71046 X-RAY EXAM CHEST 2 VIEWS: CPT | Mod: 26

## 2020-10-14 ENCOUNTER — APPOINTMENT (OUTPATIENT)
Dept: HEART AND VASCULAR | Facility: CLINIC | Age: 65
End: 2020-10-14
Payer: MEDICARE

## 2020-10-14 VITALS
HEART RATE: 69 BPM | HEIGHT: 61 IN | SYSTOLIC BLOOD PRESSURE: 98 MMHG | DIASTOLIC BLOOD PRESSURE: 50 MMHG | OXYGEN SATURATION: 91 % | WEIGHT: 170 LBS | BODY MASS INDEX: 32.1 KG/M2

## 2020-10-14 PROCEDURE — 99214 OFFICE O/P EST MOD 30 MIN: CPT

## 2020-10-14 PROCEDURE — 93000 ELECTROCARDIOGRAM COMPLETE: CPT

## 2020-10-14 NOTE — PHYSICAL EXAM
[Heart Sounds] : normal S1 and S2 [Murmurs] : no murmurs present [Heart Rate And Rhythm] : heart rate and rhythm were normal [Arterial Pulses Normal] : the arterial pulses were normal [Abdomen Tenderness] : non-tender [Abdomen Soft] : soft [Abnormal Walk] : normal gait [Abdomen Mass (___ Cm)] : no abdominal mass palpated [Nail Clubbing] : no clubbing of the fingernails [Gait - Sufficient For Exercise Testing] : the gait was sufficient for exercise testing [Cyanosis, Localized] : no localized cyanosis [Petechial Hemorrhages (___cm)] : no petechial hemorrhages [Skin Color & Pigmentation] : normal skin color and pigmentation [No Venous Stasis] : no venous stasis [] : no rash [Skin Lesions] : no skin lesions [No Skin Ulcers] : no skin ulcer [No Xanthoma] : no  xanthoma was observed [FreeTextEntry1] : pitting edema

## 2020-10-14 NOTE — ASSESSMENT
[FreeTextEntry1] : 65 F\par \par Acute Systolic Heart Failure - overloaded on exam, GUTIERREZ, orthopneic.  No resp distress, O2 sats wnl.  unclear if compliant with meds given history of leaving AMA.  \par CAD s/p CABG July 2020 \par Afib - in sinus\par EKG: sinus mando, IVCD\par ECHO: moderate/severely depressed LVEF 35% (EF 15% in Dec 2019)\par \par - counseled on importance of taking her diuretics.  advised to take lasix 80mg bid until she is able to get the prescription for torsemide 40mg BID\par - continue eliquis 5mg bid, amiodarone 200mg, asa, lipitor 80mg, entresto 49-51mg, coreg 6.25mg BID\par - obtain records from [facility]\par - return 1 week

## 2020-10-14 NOTE — REASON FOR VISIT
[FreeTextEntry1] : [facility] Hospital Course 12/5-12/7/2019:\par \par 64F with pmhx of CHF (EF 30% in june 2019, now with in house ECHO showing EF 15%), afib, asthma, breast CA (2014, s/p XRT 2014) uterine cancer s/p hysterectomy and oophrectomy , CAD (s/p SLADE to RCA 7/10/2014), depression p/w 3-4 wk history progressive SOB/GUTIERREZ, dry cough, and mildly increased lower extremity edema, found to be hypertensive with systolic BPs 200s, and with signs c/w fluid overload on exam in ED. Patient diuresed, placed on bipap, and started nitro gtt. Upon arrival to ccu L radial a line placed, patient switched to nicardipine gtt, weaned of bipap and with good uop. Discontinued nicardipine gtt, started on entresto, 40mg IVP lasix, and increased lopressor dose to 50mg. Discussed with patient benefit of cardiac catheterization given the new drop in LVEF.  Pt refused and said she wants to go home now. She does not want to wait in the hospital. Patient said she will have it done "another time". We spoke to the son as well who agreed with his Mother's decision to leave.  He says he will ensure she takes her medications and that she sees her cardiologist early next week. After discussing the situation with the son and the patient, the patient and family understands the risks of leaving the hospital while in acute heart failure and without a coronary evaluation.  Patient signed AMA and medications will be sent to her pharmacy. \par

## 2020-10-14 NOTE — HISTORY OF PRESENT ILLNESS
[FreeTextEntry1] : 10/7/20: Since leaving AMA from [facility] in Dec 2019 for acute heart failure. Had CABG July 2020 ago at [facility].  Saw the CT surgeon once post operatively.  Notes her legs are swelling and she has trouble breathing.  has not been back to hospital since July.  Cant walk one block before getting short of breath.  no chest pain.  can lay only on her side, feels SOB when laying flat on her back.  says she is compliant with meds.  \par \par 10/14/20:  returns after increasing diuretic to torsemide 40BID.  patient has not been taking med as did not get it from pharmacy yet.  still with LE swelling, exertional sob.  \par \par EKG: sinus bradycardia, 1st deg AVB, IVCD\par \par Non smoker\par No etoh\par NKDA \par Fhx: Mother CAD

## 2020-10-28 ENCOUNTER — APPOINTMENT (OUTPATIENT)
Dept: HEART AND VASCULAR | Facility: CLINIC | Age: 65
End: 2020-10-28

## 2020-11-04 ENCOUNTER — APPOINTMENT (OUTPATIENT)
Dept: HEART AND VASCULAR | Facility: CLINIC | Age: 65
End: 2020-11-04
Payer: MEDICARE

## 2020-11-04 VITALS
TEMPERATURE: 96.6 F | OXYGEN SATURATION: 93 % | DIASTOLIC BLOOD PRESSURE: 60 MMHG | BODY MASS INDEX: 29.83 KG/M2 | HEIGHT: 61 IN | HEART RATE: 67 BPM | SYSTOLIC BLOOD PRESSURE: 108 MMHG | WEIGHT: 157.98 LBS

## 2020-11-04 PROCEDURE — 99214 OFFICE O/P EST MOD 30 MIN: CPT

## 2020-11-04 PROCEDURE — 93970 EXTREMITY STUDY: CPT

## 2020-11-05 NOTE — REASON FOR VISIT
[FreeTextEntry1] : [facility] Hospital Course 12/5-12/7/2019:\par \par 64F with pmhx of CHF (EF 30% in june 2019, now with in house ECHO showing EF 15%), afib, asthma, breast CA (2014, s/p XRT 2014) uterine cancer s/p hysterectomy and oophrectomy , CAD (s/p SLADE to RCA 7/10/2014), depression p/w 3-4 wk history progressive SOB/GUTIERREZ, dry cough, and mildly increased lower extremity edema, found to be hypertensive with systolic BPs 200s, and with signs c/w fluid overload on exam in ED. Patient diuresed, placed on bipap, and started nitro gtt. Upon arrival to ccu L radial a line placed, patient switched to nicardipine gtt, weaned of bipap and with good uop. Discontinued nicardipine gtt, started on entresto, 40mg IVP lasix, and increased lopressor dose to 50mg. Discussed with patient benefit of cardiac catheterization given the new drop in LVEF.  Pt refused and said she wants to go home now. She does not want to wait in the hospital. Patient said she will have it done "another time". We spoke to the son as well who agreed with his Mother's decision to leave.  He says he will ensure she takes her medications and that she sees her cardiologist early next week. After discussing the situation with the son and the patient, the patient and family understands the risks of leaving the hospital while in acute heart failure and without a coronary evaluation.  Patient signed AMA and medications will be sent to her pharmacy. \par

## 2020-11-05 NOTE — HISTORY OF PRESENT ILLNESS
[FreeTextEntry1] : 10/7/20: Since leaving AMA from [facility] in Dec 2019 for acute heart failure. Had CABG July 2020 ago at [facility].  Saw the CT surgeon once post operatively.  Notes her legs are swelling and she has trouble breathing.  has not been back to hospital since July.  Cant walk one block before getting short of breath.  no chest pain.  can lay only on her side, feels SOB when laying flat on her back.  says she is compliant with meds.  \par \par 10/14/20:  returns after increasing diuretic to torsemide 40BID.  patient has not been taking med as did not get it from pharmacy yet.  still with LE swelling, exertional sob.  \par \par 11/4/20: been taking torsemide 40mg BID.  Lost 12 lbs.  much improved breathing.  no sob. no cp.  leg swelling a lot better.   notes some tenderness/warmth/erythema below her knee over the last week, no trauma or breaks in skin.   \par \par EKG: sinus bradycardia, 1st deg AVB, IVCD\par \par Non smoker\par No etoh\par NKDA \par Fhx: Mother CAD

## 2020-11-05 NOTE — ASSESSMENT
[FreeTextEntry1] : 65 F\par \par Acute Systolic Heart Failure -  near euvolemia, much improved from prior exam.   \par CAD s/p CABG July 2020 - stable \par Afib - in sinus\par EKG: sinus mando, IVCD\par ECHO: moderate/severely depressed LVEF 35% (EF 15% in Dec 2019)\par \par - can reduce torsemide to 40mg once daily.  counseled patient to go back to BID if symptoms recur and/or develops weight gain and to call our office\par - refusing repeat labs to check BMP and lytes\par - venous duplex today with no evidence of DVT.  Will treat for cellulitis with 5 day course of keflex. Advised to call if no improvement.  Referral given as patient does not have a PCP.  \par - continue eliquis 5mg bid, amiodarone 200mg, asa, lipitor 80mg, entresto 49-51mg, coreg 6.25mg BID\par - rtc 1 month

## 2020-11-05 NOTE — PHYSICAL EXAM
[Heart Rate And Rhythm] : heart rate and rhythm were normal [Heart Sounds] : normal S1 and S2 [Murmurs] : no murmurs present [Arterial Pulses Normal] : the arterial pulses were normal [Abdomen Soft] : soft [Abdomen Tenderness] : non-tender [Abdomen Mass (___ Cm)] : no abdominal mass palpated [Abnormal Walk] : normal gait [Gait - Sufficient For Exercise Testing] : the gait was sufficient for exercise testing [Nail Clubbing] : no clubbing of the fingernails [Cyanosis, Localized] : no localized cyanosis [Petechial Hemorrhages (___cm)] : no petechial hemorrhages [Skin Color & Pigmentation] : normal skin color and pigmentation [] : no rash [No Venous Stasis] : no venous stasis [Skin Lesions] : no skin lesions [No Skin Ulcers] : no skin ulcer [No Xanthoma] : no  xanthoma was observed [FreeTextEntry1] : pitting edema

## 2020-11-18 ENCOUNTER — APPOINTMENT (OUTPATIENT)
Dept: HEART AND VASCULAR | Facility: CLINIC | Age: 65
End: 2020-11-18

## 2020-12-22 ENCOUNTER — NON-APPOINTMENT (OUTPATIENT)
Age: 65
End: 2020-12-22

## 2020-12-22 ENCOUNTER — APPOINTMENT (OUTPATIENT)
Dept: HEART AND VASCULAR | Facility: CLINIC | Age: 65
End: 2020-12-22
Payer: MEDICARE

## 2020-12-22 VITALS
DIASTOLIC BLOOD PRESSURE: 60 MMHG | BODY MASS INDEX: 30.21 KG/M2 | OXYGEN SATURATION: 90 % | HEART RATE: 56 BPM | TEMPERATURE: 96.3 F | SYSTOLIC BLOOD PRESSURE: 110 MMHG | HEIGHT: 61 IN | WEIGHT: 159.99 LBS

## 2020-12-22 PROCEDURE — 93000 ELECTROCARDIOGRAM COMPLETE: CPT

## 2020-12-22 PROCEDURE — 99214 OFFICE O/P EST MOD 30 MIN: CPT

## 2020-12-22 NOTE — PROCEDURE
[de-identified] : MD Linden  [de-identified] : Naklu Argueta, KANDICE RVT  [de-identified] : Swelling of limb - M79.89  [FreeTextEntry1] : Procedure:\par Bilateral lower extremity venous duplex imaging was performed in the reverse Trendelenburg position, and reflex times were obtained in the supine position. \par \par FINDINGS: \par Right lower extremity:\par The right common femoral, deep femoral, saphenofemoral junction, femoral, popliteal, posterior tibial, great saphenous, and small saphenous veins were visualized and were normally compressible.  Color and Spectral Doppler flow pattern and response to augmentation maneuvers in the right common femoral, popliteal, and great saphenous veins were normal.\par Edema of the calf noted.\par \par Left lower extremity:\par The left common femoral, deep femoral, saphenofemoral junction, femoral, popliteal, posterior tibial, great saphenous, and small saphenous veins were visualized and were normally compressible. Color and Spectral Doppler flow pattern and response to augmentation maneuvers in the left common femoral, popliteal, and great saphenous veins were normal.\par

## 2020-12-22 NOTE — IMPRESSION
[FreeTextEntry1] : 1. RIGHT: No evidence of right lower extremity deep or superficial venous thrombosis.\par 2. LEFT: No evidence of left lower extremity deep or superficial venous thrombosis.\par 3. Competent superficial vein system with no significant evidence of reflux disease bilaterally.\par

## 2020-12-22 NOTE — HISTORY OF PRESENT ILLNESS
[FreeTextEntry1] : 10/7/20: Since leaving AMA from [facility] in Dec 2019 for acute heart failure. Had CABG July 2020 ago at [facility].  Saw the CT surgeon once post operatively.  Notes her legs are swelling and she has trouble breathing.  has not been back to hospital since July.  Cant walk one block before getting short of breath.  no chest pain.  can lay only on her side, feels SOB when laying flat on her back.  says she is compliant with meds.  \par \par 10/14/20:  returns after increasing diuretic to torsemide 40BID.  patient has not been taking med as did not get it from pharmacy yet.  still with LE swelling, exertional sob.  \par \par 11/4/20: been taking torsemide 40mg BID.  Lost 12 lbs.  much improved breathing.  no sob. no cp.  leg swelling a lot better.   notes some tenderness/warmth/erythema below her knee over the last week, no trauma or breaks in skin.   \par \par 12/22/20: duplex with no dvt,.  LE swelling improving, has some GUTIERREZ after walking 1-2 blocks which is mild improvement since CABG.    no chest pain. no palps.  no orthopnea\par \par EKG: sinus bradycardia, 1st deg AVB, IVCD\par \par Non smoker\par No etoh\par NKDA \par Fhx: Mother CAD

## 2020-12-22 NOTE — ASSESSMENT
[FreeTextEntry1] : 65 F\par \par Systolic Heart Failure -  near euvolemia, much improved from prior exam.  Still with some GUTIERREZ.    \par CAD s/p CABG July 2020 - stable, no chest pain\par SInus Bradycardia EKG rates high 40s\par Afib - in sinus\par EKG: sinus mando, IVCD\par ECHO: moderate/severely depressed LVEF 35% (EF 15% in Dec 2019)\par \par - torsemide 40mg once daily\par - check BMP and lytes, BNP, cbc \par - decrease coreg to 3.125mg BID given mando rates upper 40s\par - continue eliquis 5mg bid, amiodarone 200mg, asa, lipitor 80mg, entresto 49-51mg\par - counseled on LE compression therapy \par - rtc 1 month

## 2020-12-22 NOTE — REASON FOR VISIT
[FreeTextEntry1] : [facility] Hospital Course 12/5-12/7/2019:\par \par 64F with pmhx of CHF (EF 30% in june 2019, now with in house ECHO showing EF 15%), afib, asthma, breast CA (2014, s/p XRT 2014) uterine cancer s/p hysterectomy and oophrectomy , CAD (s/p SLADE to RCA 7/10/2014), depression p/w 3-4 wk history progressive SOB/GUTIERREZ, dry cough, and mildly increased lower extremity edema, found to be hypertensive with systolic BPs 200s, and with signs c/w fluid overload on exam in ED. Patient diuresed, placed on bipap, and started nitro gtt. Upon arrival to ccu L radial a line placed, patient switched to nicardipine gtt, weaned of bipap and with good uop. Discontinued nicardipine gtt, started on entresto, 40mg IVP lasix, and increased lopressor dose to 50mg. Discussed with patient benefit of cardiac catheterization given the new drop in LVEF.  Pt refused and said she wants to go home now. She does not want to wait in the hospital. Patient said she will have it done "another time". We spoke to the son as well who agreed with his Mother's decision to leave.  He says he will ensure she takes her medications and that she sees her cardiologist early next week. After discussing the situation with the son and the patient, the patient and family understands the risks of leaving the hospital while in acute heart failure and without a coronary evaluation.  Patient signed AMA and medications will be sent to her pharmacy. \par

## 2020-12-23 LAB
BASOPHILS # BLD AUTO: 0.05 K/UL
BASOPHILS NFR BLD AUTO: 0.8 %
EOSINOPHIL # BLD AUTO: 0.13 K/UL
EOSINOPHIL NFR BLD AUTO: 2.1 %
HCT VFR BLD CALC: 38.6 %
HGB BLD-MCNC: 11.2 G/DL
IMM GRANULOCYTES NFR BLD AUTO: 0.2 %
LYMPHOCYTES # BLD AUTO: 1.1 K/UL
LYMPHOCYTES NFR BLD AUTO: 17.6 %
MAN DIFF?: NORMAL
MCHC RBC-ENTMCNC: 29 GM/DL
MCHC RBC-ENTMCNC: 30 PG
MCV RBC AUTO: 103.5 FL
MONOCYTES # BLD AUTO: 0.71 K/UL
MONOCYTES NFR BLD AUTO: 11.3 %
NEUTROPHILS # BLD AUTO: 4.26 K/UL
NEUTROPHILS NFR BLD AUTO: 68 %
NT-PROBNP SERPL-MCNC: 2138 PG/ML
PLATELET # BLD AUTO: 195 K/UL
RBC # BLD: 3.73 M/UL
RBC # FLD: 16.4 %
WBC # FLD AUTO: 6.26 K/UL

## 2021-01-20 ENCOUNTER — APPOINTMENT (OUTPATIENT)
Dept: HEART AND VASCULAR | Facility: CLINIC | Age: 66
End: 2021-01-20
Payer: MEDICARE

## 2021-01-20 VITALS
OXYGEN SATURATION: 97 % | WEIGHT: 159 LBS | TEMPERATURE: 97.2 F | DIASTOLIC BLOOD PRESSURE: 62 MMHG | SYSTOLIC BLOOD PRESSURE: 104 MMHG | BODY MASS INDEX: 30.02 KG/M2 | HEIGHT: 61 IN | HEART RATE: 60 BPM

## 2021-01-20 DIAGNOSIS — R20.0 PAIN IN LEFT ARM: ICD-10-CM

## 2021-01-20 DIAGNOSIS — M79.602 PAIN IN LEFT ARM: ICD-10-CM

## 2021-01-20 PROCEDURE — 93971 EXTREMITY STUDY: CPT

## 2021-01-20 PROCEDURE — 93926 LOWER EXTREMITY STUDY: CPT

## 2021-01-20 PROCEDURE — 93000 ELECTROCARDIOGRAM COMPLETE: CPT

## 2021-01-20 PROCEDURE — 99213 OFFICE O/P EST LOW 20 MIN: CPT

## 2021-01-20 NOTE — PHYSICAL EXAM
[Heart Rate And Rhythm] : heart rate and rhythm were normal [Heart Sounds] : normal S1 and S2 [Murmurs] : no murmurs present [Arterial Pulses Normal] : the arterial pulses were normal [Abdomen Soft] : soft [Abdomen Tenderness] : non-tender [Abdomen Mass (___ Cm)] : no abdominal mass palpated [Abnormal Walk] : normal gait [Gait - Sufficient For Exercise Testing] : the gait was sufficient for exercise testing [Nail Clubbing] : no clubbing of the fingernails [Cyanosis, Localized] : no localized cyanosis [Petechial Hemorrhages (___cm)] : no petechial hemorrhages [Skin Color & Pigmentation] : normal skin color and pigmentation [] : no rash [No Venous Stasis] : no venous stasis [Skin Lesions] : no skin lesions [No Skin Ulcers] : no skin ulcer [No Xanthoma] : no  xanthoma was observed [FreeTextEntry1] : 1-2+ pitting edema

## 2021-01-20 NOTE — ASSESSMENT
[FreeTextEntry1] : 65 F\par \par Left Upper Extremity Pain/Numbness - radiating down to hand.  Pulses intact, ROM appears grossly normal, sensation/motor strength intact. \par Systolic Heart Failure -  LE swelling worse, has not been taking her torsemide. \par CAD s/p CABG July 2020 - stable, no chest pain\par SInus Bradycardia EKG rates high 40s\par Afib - in sinus\par EKG: sinus mando, IVCD\par ECHO: moderate/severely depressed LVEF 35% (EF 15% in Dec 2019)\par \par - check LUE arterial and venous duplex .  If normal will send to ortho\par - torsemide 40mg once daily\par - coreg 3.125mg BID \par - continue eliquis 5mg bid, amiodarone 200mg, asa, lipitor 80mg, entresto 49-51mg\par

## 2021-01-20 NOTE — PROCEDURE
[de-identified] : MD Linden  [de-identified] : Nakul Argueta, KANDICE RVT  [de-identified] : Swelling of limb - M79.89  [FreeTextEntry1] : Procedure:\par Bilateral lower extremity venous duplex imaging was performed in the reverse Trendelenburg position, and reflex times were obtained in the supine position. \par \par FINDINGS: \par Right lower extremity:\par The right common femoral, deep femoral, saphenofemoral junction, femoral, popliteal, posterior tibial, great saphenous, and small saphenous veins were visualized and were normally compressible.  Color and Spectral Doppler flow pattern and response to augmentation maneuvers in the right common femoral, popliteal, and great saphenous veins were normal.\par Edema of the calf noted.\par \par Left lower extremity:\par The left common femoral, deep femoral, saphenofemoral junction, femoral, popliteal, posterior tibial, great saphenous, and small saphenous veins were visualized and were normally compressible. Color and Spectral Doppler flow pattern and response to augmentation maneuvers in the left common femoral, popliteal, and great saphenous veins were normal.\par

## 2021-01-20 NOTE — HISTORY OF PRESENT ILLNESS
[FreeTextEntry1] : 10/7/20: Since leaving AMA from [facility] in Dec 2019 for acute heart failure. Had CABG July 2020 ago at [facility].  Saw the CT surgeon once post operatively.  Notes her legs are swelling and she has trouble breathing.  has not been back to hospital since July.  Cant walk one block before getting short of breath.  no chest pain.  can lay only on her side, feels SOB when laying flat on her back.  says she is compliant with meds.  \par \par 10/14/20:  returns after increasing diuretic to torsemide 40BID.  patient has not been taking med as did not get it from pharmacy yet.  still with LE swelling, exertional sob.  \par \par 11/4/20: been taking torsemide 40mg BID.  Lost 12 lbs.  much improved breathing.  no sob. no cp.  leg swelling a lot better.   notes some tenderness/warmth/erythema below her knee over the last week, no trauma or breaks in skin.   \par 12/22/20: duplex with no dvt,.  LE swelling improving, has some GUTIERREZ after walking 1-2 blocks which is mild improvement since CABG.    no chest pain. no palps.  no orthopnea\par \par 1/20/21: has not been taking her meds regularly and her leg swelling has gotten a little worse.  no cp.  GUTIERREZ improving.  has had very sharp left arm pain for a month that radiates to hand.  worse at night.  unclear if had any trauma to area.  no arm swelling.  notes some numbness in her fingers\par \par EKG: sinus bradycardia, 1st deg AVB, IVCD\par \par Non smoker\par No etoh\par NKDA \par Fhx: Mother CAD

## 2021-01-20 NOTE — REASON FOR VISIT
[Initial Evaluation] : an initial evaluation of [FreeTextEntry1] : [facility] Hospital Course 12/5-12/7/2019:\par \par 64F with pmhx of CHF (EF 30% in june 2019, now with in house ECHO showing EF 15%), afib, asthma, breast CA (2014, s/p XRT 2014) uterine cancer s/p hysterectomy and oophrectomy , CAD (s/p SLADE to RCA 7/10/2014), depression p/w 3-4 wk history progressive SOB/GUTIERREZ, dry cough, and mildly increased lower extremity edema, found to be hypertensive with systolic BPs 200s, and with signs c/w fluid overload on exam in ED. Patient diuresed, placed on bipap, and started nitro gtt. Upon arrival to ccu L radial a line placed, patient switched to nicardipine gtt, weaned of bipap and with good uop. Discontinued nicardipine gtt, started on entresto, 40mg IVP lasix, and increased lopressor dose to 50mg. Discussed with patient benefit of cardiac catheterization given the new drop in LVEF.  Pt refused and said she wants to go home now. She does not want to wait in the hospital. Patient said she will have it done "another time". We spoke to the son as well who agreed with his Mother's decision to leave.  He says he will ensure she takes her medications and that she sees her cardiologist early next week. After discussing the situation with the son and the patient, the patient and family understands the risks of leaving the hospital while in acute heart failure and without a coronary evaluation.  Patient signed AMA and medications will be sent to her pharmacy. \par

## 2021-03-22 ENCOUNTER — APPOINTMENT (OUTPATIENT)
Age: 66
End: 2021-03-22
Payer: MEDICARE

## 2021-03-22 VITALS
RESPIRATION RATE: 15 BRPM | WEIGHT: 163 LBS | TEMPERATURE: 96.2 F | BODY MASS INDEX: 30.78 KG/M2 | OXYGEN SATURATION: 97 % | DIASTOLIC BLOOD PRESSURE: 71 MMHG | SYSTOLIC BLOOD PRESSURE: 124 MMHG | HEIGHT: 61 IN | HEART RATE: 52 BPM

## 2021-03-22 DIAGNOSIS — K59.00 CONSTIPATION, UNSPECIFIED: ICD-10-CM

## 2021-03-22 DIAGNOSIS — R10.9 UNSPECIFIED ABDOMINAL PAIN: ICD-10-CM

## 2021-03-22 PROCEDURE — 99204 OFFICE O/P NEW MOD 45 MIN: CPT

## 2021-03-22 RX ORDER — PANTOPRAZOLE 40 MG/1
40 TABLET, DELAYED RELEASE ORAL
Qty: 30 | Refills: 1 | Status: ACTIVE | COMMUNITY
Start: 2021-03-22 | End: 1900-01-01

## 2021-03-22 RX ORDER — POLYETHYLENE GLYCOL 3350 17 G/17G
17 POWDER, FOR SOLUTION ORAL
Qty: 2 | Refills: 2 | Status: ACTIVE | COMMUNITY
Start: 2021-03-22 | End: 1900-01-01

## 2021-03-29 NOTE — ASSESSMENT
[FreeTextEntry1] : 66F w/ PMHx of asthma, CHF, CAD s/p CABG 7/20, afib on AC, hx of breast and uterine ca p/w abdominal bloating.\par \par #Abdominal Bloating and Belching\par Dx is broad as patient is limited with hx of prior evaluation.  Suspect biliary disease, gastritis, reflux, and IBS-C. Lower on the differential includes adhesive disease given prior surgery. \par -Start PPI 40 mg qd\par -Start Miralax qhs PRN, consider restarting Linzess \par -Obtain abdominal ultrasound\par -If sx persists, plan for referral for cardiac evaluation prior to endoscopic evaluation\par -Obtain medical record of prior GI endoscopy/evaluation. Written instruction provided for patient to ask her son to assist\par \par #Macrocytic Anemia\par Patient denies rectal bleeding, anemia in the setting of prior surgery.  \par -Obtain lab including CBC, iron studies, B12/Folate at next visit with cardiology\par \par #HCM\par Patient encouraged to obtain COVID19 Vaccination\par \par Televisit in 6-8 weeks\par \par Dipesh Gutierrez MD\par Gastroenterology Fellow

## 2021-03-29 NOTE — HISTORY OF PRESENT ILLNESS
[FreeTextEntry1] : [place] [employer]  #[phone].  \par 66F w/ PMHx of asthma, CHF, CAD s/p CABG 7/20, afib on AC, hx of breast and uterine ca p/w abdominal bloating.\par Patient reports abdominal bloating with associated SOB and belching.  Worse with eating.  Reports mild wt loss. Denies improvement with defecation.\par Have 2-3 formed to hard BM per week.  Hx of abdominal surgery for uterine ca.  Patient with Linzess and Creon in EMR but unable to verify if she is taking or why she was on the medication.  \par Denies heartburn, nausea, vomiting, hematochezia, melena, dysphagia, odynophagia.  Normal appetite.\par \par Denies smoking, alcohol and drug use.\par Denies fam hx of GI mlignancy\par EGD/Colonoscopy around 5-6 yr ago, unclear finding but thought there was some polyp.  \par

## 2021-03-29 NOTE — ASSESSMENT
[FreeTextEntry1] : 66F w/ PMHx of asthma, CHF, CAD s/p CABG 7/20, afib on AC, hx of breast and uterine ca p/w abdominal bloating.\par \par #Abdominal Bloating and Belching\par Dx is broad as patient is limited with hx of prior evaluation.  Suspect biliary disease, gastritis, reflux, and IBS-C. Lower on the differential includes adhesive disease given prior surgery. \par -Start PPI 40 mg qd\par -Start Miralax qhs PRN, consider restarting Linzess \par -Obtain abdominal ultrasound\par -If sx persists, plan for referral for cardiac evaluation prior to endoscopic evaluation\par -Obtain medical record of prior GI endoscopy/evaluation. Written instruction provided for patient to ask her son to assist\par \par #Macrocytic Anemia\par Patient denies rectal bleeding, anemia in the setting of prior surgery.  \par -Obtain lab including CBC, iron studies, B12/Folate at next visit with cardiology\par \par #HCM\par Patient encouraged to obtain COVID19 Vaccination\par \par Televisit in 6-8 weeks\par \par Dpiesh Gutierrez MD\par Gastroenterology Fellow

## 2021-03-29 NOTE — PHYSICAL EXAM
[General Appearance - Alert] : alert [General Appearance - In No Acute Distress] : in no acute distress [Sclera] : the sclera and conjunctiva were normal [Extraocular Movements] : extraocular movements were intact [Neck Appearance] : the appearance of the neck was normal [] : no respiratory distress [Exaggerated Use Of Accessory Muscles For Inspiration] : no accessory muscle use [Bowel Sounds] : normal bowel sounds [Abdomen Soft] : soft [Abnormal Walk] : normal gait [Musculoskeletal - Swelling] : no joint swelling seen [Skin Color & Pigmentation] : normal skin color and pigmentation [Skin Turgor] : normal skin turgor [Cranial Nerves] : cranial nerves 2-12 were intact [Deep Tendon Reflexes (DTR)] : deep tendon reflexes were 2+ and symmetric [Oriented To Time, Place, And Person] : oriented to person, place, and time [Impaired Insight] : insight and judgment were intact [FreeTextEntry1] : Mild RUQ tenderness to deep palpation

## 2021-03-29 NOTE — END OF VISIT
[] : Fellow [FreeTextEntry3] : 45 mins non teaching FTF time to review anemia, abd pain bloating and belching [Time Spent: ___ minutes] : I have spent [unfilled] minutes of time on the encounter.

## 2021-03-29 NOTE — HISTORY OF PRESENT ILLNESS
[FreeTextEntry1] : [place] Phone  #[phone].  \par 66F w/ PMHx of asthma, CHF, CAD s/p CABG 7/20, afib on AC, hx of breast and uterine ca p/w abdominal bloating.\par Patient reports abdominal bloating with associated SOB and belching.  Worse with eating.  Reports mild wt loss. Denies improvement with defecation.\par Have 2-3 formed to hard BM per week.  Hx of abdominal surgery for uterine ca.  Patient with Linzess and Creon in EMR but unable to verify if she is taking or why she was on the medication.  \par Denies heartburn, nausea, vomiting, hematochezia, melena, dysphagia, odynophagia.  Normal appetite.\par \par Denies smoking, alcohol and drug use.\par Denies fam hx of GI mlignancy\par EGD/Colonoscopy around 5-6 yr ago, unclear finding but thought there was some polyp.  \par

## 2021-04-20 ENCOUNTER — NON-APPOINTMENT (OUTPATIENT)
Age: 66
End: 2021-04-20

## 2021-04-20 ENCOUNTER — APPOINTMENT (OUTPATIENT)
Dept: HEART AND VASCULAR | Facility: CLINIC | Age: 66
End: 2021-04-20
Payer: MEDICARE

## 2021-04-20 VITALS
BODY MASS INDEX: 31.15 KG/M2 | HEART RATE: 51 BPM | WEIGHT: 164.99 LBS | OXYGEN SATURATION: 94 % | SYSTOLIC BLOOD PRESSURE: 130 MMHG | HEIGHT: 61 IN | DIASTOLIC BLOOD PRESSURE: 70 MMHG

## 2021-04-20 DIAGNOSIS — M79.604 PAIN IN RIGHT LEG: ICD-10-CM

## 2021-04-20 PROCEDURE — 99213 OFFICE O/P EST LOW 20 MIN: CPT

## 2021-04-20 PROCEDURE — 93000 ELECTROCARDIOGRAM COMPLETE: CPT

## 2021-04-20 NOTE — PHYSICAL EXAM
[Heart Rate And Rhythm] : heart rate and rhythm were normal [Heart Sounds] : normal S1 and S2 [Murmurs] : no murmurs present [Arterial Pulses Normal] : the arterial pulses were normal [Abdomen Soft] : soft [Abdomen Tenderness] : non-tender [Abdomen Mass (___ Cm)] : no abdominal mass palpated [Abnormal Walk] : normal gait [Gait - Sufficient For Exercise Testing] : the gait was sufficient for exercise testing [Nail Clubbing] : no clubbing of the fingernails [Cyanosis, Localized] : no localized cyanosis [Petechial Hemorrhages (___cm)] : no petechial hemorrhages [Skin Color & Pigmentation] : normal skin color and pigmentation [] : no rash [No Venous Stasis] : no venous stasis [Skin Lesions] : no skin lesions [No Skin Ulcers] : no skin ulcer [No Xanthoma] : no  xanthoma was observed [FreeTextEntry1] : 1-2+ pitting edema RLE, painful to touch at level of ankle, decreased range of motion

## 2021-04-20 NOTE — HISTORY OF PRESENT ILLNESS
[FreeTextEntry1] : 10/7/20: Since leaving AMA from [facility] in Dec 2019 for acute heart failure. Had CABG July 2020 ago at [facility].  Saw the CT surgeon once post operatively.  Notes her legs are swelling and she has trouble breathing.  has not been back to hospital since July.  Cant walk one block before getting short of breath.  no chest pain.  can lay only on her side, feels SOB when laying flat on her back.  says she is compliant with meds.  \par \par 10/14/20:  returns after increasing diuretic to torsemide 40BID.  patient has not been taking med as did not get it from pharmacy yet.  still with LE swelling, exertional sob.  \par \par 11/4/20: been taking torsemide 40mg BID.  Lost 12 lbs.  much improved breathing.  no sob. no cp.  leg swelling a lot better.   notes some tenderness/warmth/erythema below her knee over the last week, no trauma or breaks in skin.   \par 12/22/20: duplex with no dvt,.  LE swelling improving, has some GUTIERREZ after walking 1-2 blocks which is mild improvement since CABG.    no chest pain. no palps.  no orthopnea\par \par 1/20/21: has not been taking her meds regularly and her leg swelling has gotten a little worse.  no cp.  GUTIERREZ improving.  has had very sharp left arm pain for a month that radiates to hand.  worse at night.  unclear if had any trauma to area.  no arm swelling.  notes some numbness in her fingers\par \par 4/20/21:  fell at home one week ago due to uneven floor, hurt right knee and ankle.  Ankle is swollen and cannot move it well.  compliant with torsemide. doesn’t walk much, stays in apt all the time.  no chest pain\par \par EKG: sinus bradycardia, 1st deg AVB, IVCD\par \par Non smoker\par No etoh\par NKDA \par Fhx: Mother CAD

## 2021-04-20 NOTE — REASON FOR VISIT
[Initial Evaluation] : an initial evaluation of [FreeTextEntry1] : [facility] Hospital Course 12/5-12/7/2019:\par \par 64F with pmhx of CHF (EF 30% in june 2019, now with in house ECHO showing EF 15%), afib, asthma, breast CA (2014, s/p XRT 2014) uterine cancer s/p hysterectomy and oophrectomy , CAD (s/p SLADE to RCA 7/10/2014), depression p/w 3-4 wk history progressive SOB/GUTIERREZ, dry cough, and mildly increased lower extremity edema, found to be hypertensive with systolic BPs 200s, and with signs c/w fluid overload on exam in ED. Patient diuresed, placed on bipap, and started nitro gtt. Upon arrival to ccu L radial a line placed, patient switched to nicardipine gtt, weaned of bipap and with good uop. Discontinued nicardipine gtt, started on entresto, 40mg IVP lasix, and increased lopressor dose to 50mg. Discussed with patient benefit of cardiac catheterization given the new drop in LVEF.  Pt refused and said she wants to go home now. She does not want to wait in the hospital. Patient said she will have it done "another time". We spoke to the son as well who agreed with his Mother's decision to leave.  He says he will ensure she takes her medications and that she sees her cardiologist early next week. After discussing the situation with the son and the patient, the patient and family understands the risks of leaving the hospital while in acute heart failure and without a coronary evaluation.  Patient signed AMA and medications will be sent to her pharmacy. \par

## 2021-04-20 NOTE — ASSESSMENT
[FreeTextEntry1] : 65 F\par \par RLE Swelling/Pain s/p fall - appears to have damage ankle.  \par Systolic Heart Failure -  stable\par CAD s/p CABG July 2020 - stable, no chest pain\par SInus Bradycardia EKG rates low 50s, unchanged\par Afib - in sinus\par EKG: sinus mando, IVCD\par ECHO: moderate/severely depressed LVEF 35% (EF 15% in Dec 2019)\par \par \par - refusing blood work\par - torsemide 40mg once daily\par - coreg 3.125mg BID, eliquis 5mg bid, amiodarone 200mg, lipitor 80mg, entresto 49-51mg\par - needs ortho appt for injured ankle, will give referral  \par - repeat venous duplex rule out DVT (lower suspicion as she is on eliquis)\par

## 2021-04-20 NOTE — PROCEDURE
[FreeTextEntry1] : Procedure:\par Bilateral lower extremity venous duplex imaging was performed in the reverse Trendelenburg position, and reflex times were obtained in the supine position. \par \par FINDINGS: \par Right lower extremity:\par The right common femoral, deep femoral, saphenofemoral junction, femoral, popliteal, posterior tibial, great saphenous, and small saphenous veins were visualized and were normally compressible.  Color and Spectral Doppler flow pattern and response to augmentation maneuvers in the right common femoral, popliteal, and great saphenous veins were normal.\par Edema of the calf noted.\par \par Left lower extremity:\par The left common femoral, deep femoral, saphenofemoral junction, femoral, popliteal, posterior tibial, great saphenous, and small saphenous veins were visualized and were normally compressible. Color and Spectral Doppler flow pattern and response to augmentation maneuvers in the left common femoral, popliteal, and great saphenous veins were normal.\par  [de-identified] : MD Linden  [de-identified] : Nakul Argueta, KANDICE RVT  [de-identified] : Pain in the  limb - M79.601

## 2021-04-23 ENCOUNTER — APPOINTMENT (OUTPATIENT)
Dept: ORTHOPEDIC SURGERY | Facility: CLINIC | Age: 66
End: 2021-04-23
Payer: MEDICARE

## 2021-04-23 ENCOUNTER — APPOINTMENT (OUTPATIENT)
Dept: HEART AND VASCULAR | Facility: CLINIC | Age: 66
End: 2021-04-23

## 2021-04-23 VITALS
DIASTOLIC BLOOD PRESSURE: 76 MMHG | WEIGHT: 164 LBS | HEIGHT: 63 IN | BODY MASS INDEX: 29.06 KG/M2 | SYSTOLIC BLOOD PRESSURE: 150 MMHG | OXYGEN SATURATION: 98 % | HEART RATE: 53 BPM

## 2021-04-23 DIAGNOSIS — Z80.9 FAMILY HISTORY OF MALIGNANT NEOPLASM, UNSPECIFIED: ICD-10-CM

## 2021-04-23 DIAGNOSIS — L03.115 CELLULITIS OF RIGHT LOWER LIMB: ICD-10-CM

## 2021-04-23 DIAGNOSIS — Z60.2 PROBLEMS RELATED TO LIVING ALONE: ICD-10-CM

## 2021-04-23 DIAGNOSIS — Z78.9 OTHER SPECIFIED HEALTH STATUS: ICD-10-CM

## 2021-04-23 DIAGNOSIS — Z72.3 LACK OF PHYSICAL EXERCISE: ICD-10-CM

## 2021-04-23 DIAGNOSIS — Z87.09 PERSONAL HISTORY OF OTHER DISEASES OF THE RESPIRATORY SYSTEM: ICD-10-CM

## 2021-04-23 DIAGNOSIS — Z86.79 PERSONAL HISTORY OF OTHER DISEASES OF THE CIRCULATORY SYSTEM: ICD-10-CM

## 2021-04-23 PROCEDURE — 99204 OFFICE O/P NEW MOD 45 MIN: CPT

## 2021-04-23 PROCEDURE — 73630 X-RAY EXAM OF FOOT: CPT | Mod: RT

## 2021-04-23 PROCEDURE — 73610 X-RAY EXAM OF ANKLE: CPT | Mod: RT

## 2021-04-23 PROCEDURE — 73562 X-RAY EXAM OF KNEE 3: CPT | Mod: RT

## 2021-04-23 RX ORDER — CEPHALEXIN 500 MG/1
500 CAPSULE ORAL 4 TIMES DAILY
Qty: 20 | Refills: 0 | Status: COMPLETED | COMMUNITY
Start: 2020-11-04 | End: 2021-04-23

## 2021-04-23 RX ORDER — LEVOFLOXACIN 500 MG/1
500 TABLET, FILM COATED ORAL
Qty: 10 | Refills: 0 | Status: COMPLETED | COMMUNITY
Start: 2020-11-02

## 2021-04-23 SDOH — SOCIAL STABILITY - SOCIAL INSECURITY: PROBLEMS RELATED TO LIVING ALONE: Z60.2

## 2021-04-23 NOTE — REASON FOR VISIT
[Initial Visit] : an initial visit for [Knee Pain] : knee pain [Knee Injury] : knee injury [FreeTextEntry2] : ankle injury

## 2021-04-23 NOTE — PHYSICAL EXAM
[LE] : Sensory: Intact in bilateral lower extremities [de-identified] : Right foot and ankle\par Edema mild to moderate around the foot and ankle and mild ecchymoses.\par Very tender on the lateral malleolus and less tender at the tip of the medial malleolus.\par Antalgic gait.\par Ankle range of motion is with some stiffness with about 3 degrees dorsiflexion and 25 to 30 degrees plantarflexion.  Pain with inversion of the eversion.\par Intact anterior tibial tendon, gastrocsoleus, EHL.\par Foot is warm.  Normal capillary refill.\par \par Right knee\par Very tender along the MCL/medial joint line.\par 1+ valgus laxity.\par Knee range of motion 0 to 110 degrees flexion with pain on further flexion.\par Trace effusion. [de-identified] : \par X-rays of the right knee standing AP, lateral and mortise views today show mild medial and patellofemoral osteoarthritis with small osteophytes.  No fractures seen.\par \par X-rays of the right ankle AP, lateral and mortise and 2 views of the right foot AP and lat oblique show a hairline nondisplaced fracture of the lateral malleolus.  The mortise is intact.   mild degenerative changes

## 2021-04-23 NOTE — HISTORY OF PRESENT ILLNESS
[de-identified] : Ms. Daugherty is a 67 yo woman who comes in for evaluation of her right knee and ankle injured about a week ago when she fell at home.  She has not seen anyone for her injury but is having a lot of pain in the ankle and her knee.\par Pain is about 8 out of 10 worse with walking and better with rest and not putting weight on it.  It is both localized but also radiating.  She has not done any treatment.  She does have a walker at home but has not been using it and comes in with just an Ace wrap around the ankle that was quite bunched up.\par She did not bring in a list of her medications so we did not have that available.  I encouraged her to write down all of her medications and perhaps a list of the medical history so that she has a copy that she carries with her in her wallet her purse.

## 2021-04-23 NOTE — ASSESSMENT
[FreeTextEntry1] : 66-year-old woman fell last week at home.  She twisted her ankle and has a nondisplaced lateral malleolus fracture that appears very stable.  I placed her in a walking boot because there was some medial tenderness so I thought a boot was better than Air-Stirrup brace.   She should elevate and can ice.  She can move the ankle occasionally for circulation.\par She has a walker at home and I would prefer for her to walk with a walker.  She should get a shoe that is supportive like a running sneaker on her contralateral foot and similar height to the walking boot.\par We gave her a cane today to assist her to get home.\par She did feel better in the boot since it gave her ankle good support.  I will see her back in about 2 weeks for follow-up to make sure the fracture is stable and not displacing.\par There will take about 6 weeks to heal.  The knee sprain should heal and we will get her into physical therapy once the ankle is better.

## 2021-05-07 ENCOUNTER — APPOINTMENT (OUTPATIENT)
Dept: ORTHOPEDIC SURGERY | Facility: CLINIC | Age: 66
End: 2021-05-07
Payer: MEDICARE

## 2021-05-07 VITALS
WEIGHT: 164 LBS | HEART RATE: 51 BPM | BODY MASS INDEX: 29.06 KG/M2 | SYSTOLIC BLOOD PRESSURE: 123 MMHG | DIASTOLIC BLOOD PRESSURE: 74 MMHG | HEIGHT: 63 IN

## 2021-05-07 PROCEDURE — 73610 X-RAY EXAM OF ANKLE: CPT | Mod: RT

## 2021-05-07 PROCEDURE — 99213 OFFICE O/P EST LOW 20 MIN: CPT

## 2021-05-07 RX ORDER — ASPIRIN 81 MG
81 TABLET, DELAYED RELEASE (ENTERIC COATED) ORAL
Refills: 0 | Status: ACTIVE | COMMUNITY

## 2021-05-07 RX ORDER — QUETIAPINE FUMARATE 25 MG/1
25 TABLET ORAL
Refills: 0 | Status: ACTIVE | COMMUNITY

## 2021-05-07 RX ORDER — SENNOSIDES 8.6 MG TABLETS 8.6 MG/1
8.6 TABLET ORAL
Refills: 0 | Status: ACTIVE | COMMUNITY

## 2021-05-07 RX ORDER — MONTELUKAST 10 MG/1
10 TABLET, FILM COATED ORAL
Refills: 0 | Status: ACTIVE | COMMUNITY

## 2021-05-07 RX ORDER — ACETAMINOPHEN 500 MG
500 TABLET ORAL
Refills: 0 | Status: ACTIVE | COMMUNITY

## 2021-05-07 RX ORDER — APIXABAN 5 MG/1
5 TABLET, FILM COATED ORAL
Refills: 0 | Status: ACTIVE | COMMUNITY

## 2021-05-07 RX ORDER — SACUBITRIL AND VALSARTAN 49; 51 MG/1; MG/1
49-51 TABLET, FILM COATED ORAL
Refills: 0 | Status: ACTIVE | COMMUNITY

## 2021-05-07 RX ORDER — PANCRELIPASE 120000; 24000; 76000 [USP'U]/1; [USP'U]/1; [USP'U]/1
24000-76000 CAPSULE, DELAYED RELEASE PELLETS ORAL
Refills: 0 | Status: ACTIVE | COMMUNITY

## 2021-05-07 RX ORDER — ATORVASTATIN CALCIUM 80 MG/1
80 TABLET, FILM COATED ORAL
Refills: 0 | Status: ACTIVE | COMMUNITY

## 2021-05-07 RX ORDER — CARVEDILOL 6.25 MG/1
6.25 TABLET, FILM COATED ORAL
Refills: 0 | Status: ACTIVE | COMMUNITY

## 2021-05-07 NOTE — HISTORY OF PRESENT ILLNESS
[de-identified] : Ms. Daugherty comes in for f/u evaluation of her right knee and ankle injured about 3 weeks ago when she fell at home. Both the knee and ankle are improving but still has mild pain aggravated by walking and there is stiffness. She does have a walker at home but has not been using it and comes in with just an Ace wrap around the ankle that was quite bunched up.  She states she did wear the boot and use a cane some of the time.  She states that the pain has decreased in both the ankle and the knee\par

## 2021-05-07 NOTE — PHYSICAL EXAM
[LE] : Sensory: Intact in bilateral lower extremities [Normal RLE] : Right Lower Extremity: No scars, rashes, lesions, ulcers, skin intact [Normal LLE] : Left Lower Extremity: No scars, rashes, lesions, ulcers, skin intact [Normal Touch] : sensation intact for touch [Normal] : Oriented to person, place, and time, insight and judgement were intact and the affect was normal [de-identified] : Right foot and ankle\par Edema mild to moderate around the foot and ankle and mild ecchymoses.\par Very tender on the lateral malleolus and less tender at the tip of the medial malleolus.\par Antalgic gait.\par Ankle range of motion is with some stiffness with about 3 degrees dorsiflexion and 25 to 30 degrees plantarflexion.  Pain with inversion of the eversion.\par Intact anterior tibial tendon, gastrocsoleus, EHL.\par Foot is warm.  Normal capillary refill.\par \par Right knee\par Very tender along the MCL/medial joint line.\par 1+ valgus laxity.\par Knee range of motion 0 to 110 degrees flexion with pain on further flexion.\par Trace effusion. [de-identified] : \par X-rays of the right knee standing AP, lateral and mortise views 4/23/21 showed mild medial and patellofemoral osteoarthritis with small osteophytes.  No fractures seen.\par \par X-rays of the right ankle AP, lateral and mortise and 2 views of the right foot AP and lat oblique showed a hairline nondisplaced fracture of the lateral malleolus.  The mortise is intact.   mild degenerative changes

## 2021-05-07 NOTE — ASSESSMENT
[FreeTextEntry1] : 66-year-old woman fell 3 wks ago sustaining a nondisplaced lateral malleolus fracture that appears stable.  She had worn the boot but comes in today with just an Ace wrap and no pain.  Clearly the fracture is stable and no displacement on x-ray.  If she does not want to wear the boot then she could use an Aircast splint instead.  I applied a new Ace wrap today because the one she had was old and bunched up.\par Ice and elevate and heat as needed for the ankle and knee.  Range of motion as tolerated.  It will take about 3 more weeks to heal.  I would like to see her at that time and likely will refer her to physical therapy for the knee and ankle.

## 2021-05-07 NOTE — REASON FOR VISIT
[Follow-Up Visit] : a follow-up visit for [Knee Injury] : knee injury [FreeTextEntry2] : Ankle injury

## 2021-05-27 PROBLEM — S82.64XA CLOSED NONDISPLACED FRACTURE OF LATERAL MALLEOLUS OF RIGHT FIBULA, INITIAL ENCOUNTER: Status: ACTIVE | Noted: 2021-04-23

## 2021-05-27 PROBLEM — S93.421A SPRAIN OF DELTOID LIGAMENT OF RIGHT ANKLE, INITIAL ENCOUNTER: Status: ACTIVE | Noted: 2021-04-23

## 2021-05-27 PROBLEM — M17.11 PRIMARY OSTEOARTHRITIS OF RIGHT KNEE: Status: ACTIVE | Noted: 2021-04-23

## 2021-05-27 PROBLEM — S83.411A SPRAIN OF MEDIAL COLLATERAL LIGAMENT OF RIGHT KNEE, INITIAL ENCOUNTER: Status: ACTIVE | Noted: 2021-04-23

## 2021-05-28 ENCOUNTER — APPOINTMENT (OUTPATIENT)
Dept: ORTHOPEDIC SURGERY | Facility: CLINIC | Age: 66
End: 2021-05-28

## 2021-05-28 DIAGNOSIS — S93.421A SPRAIN OF DELTOID LIGAMENT OF RIGHT ANKLE, INITIAL ENCOUNTER: ICD-10-CM

## 2021-05-28 DIAGNOSIS — M17.11 UNILATERAL PRIMARY OSTEOARTHRITIS, RIGHT KNEE: ICD-10-CM

## 2021-05-28 DIAGNOSIS — S82.64XA NONDISPLACED FRACTURE OF LATERAL MALLEOLUS OF RIGHT FIBULA, INITIAL ENCOUNTER FOR CLOSED FRACTURE: ICD-10-CM

## 2021-05-28 DIAGNOSIS — S83.411A SPRAIN OF MEDIAL COLLATERAL LIGAMENT OF RIGHT KNEE, INITIAL ENCOUNTER: ICD-10-CM

## 2021-06-11 ENCOUNTER — APPOINTMENT (OUTPATIENT)
Dept: HEART AND VASCULAR | Facility: CLINIC | Age: 66
End: 2021-06-11
Payer: MEDICARE

## 2021-06-11 VITALS
WEIGHT: 164.91 LBS | SYSTOLIC BLOOD PRESSURE: 134 MMHG | TEMPERATURE: 97 F | RESPIRATION RATE: 18 BRPM | HEART RATE: 78 BPM | HEIGHT: 63 IN | DIASTOLIC BLOOD PRESSURE: 82 MMHG | OXYGEN SATURATION: 96 %

## 2021-06-11 VITALS
DIASTOLIC BLOOD PRESSURE: 76 MMHG | BODY MASS INDEX: 30.12 KG/M2 | WEIGHT: 170 LBS | TEMPERATURE: 97.6 F | SYSTOLIC BLOOD PRESSURE: 124 MMHG | HEART RATE: 51 BPM | OXYGEN SATURATION: 96 % | HEIGHT: 63 IN

## 2021-06-11 DIAGNOSIS — I50.21 ACUTE SYSTOLIC (CONGESTIVE) HEART FAILURE: ICD-10-CM

## 2021-06-11 LAB
AMMONIA BLD-MCNC: 16 UMOL/L — SIGNIFICANT CHANGE UP (ref 11–55)
INR BLD: 2.18 — HIGH (ref 0.88–1.16)
PROTHROM AB SERPL-ACNC: 25.2 SEC — HIGH (ref 10.6–13.6)
SARS-COV-2 RNA SPEC QL NAA+PROBE: NEGATIVE — SIGNIFICANT CHANGE UP

## 2021-06-11 PROCEDURE — 99285 EMERGENCY DEPT VISIT HI MDM: CPT

## 2021-06-11 PROCEDURE — G1004: CPT

## 2021-06-11 PROCEDURE — 99215 OFFICE O/P EST HI 40 MIN: CPT

## 2021-06-11 PROCEDURE — 74177 CT ABD & PELVIS W/CONTRAST: CPT | Mod: 26,MG

## 2021-06-11 PROCEDURE — 71046 X-RAY EXAM CHEST 2 VIEWS: CPT | Mod: 26

## 2021-06-11 RX ORDER — IOHEXOL 300 MG/ML
30 INJECTION, SOLUTION INTRAVENOUS ONCE
Refills: 0 | Status: COMPLETED | OUTPATIENT
Start: 2021-06-11 | End: 2021-06-11

## 2021-06-11 RX ORDER — TORSEMIDE 20 MG/1
20 TABLET ORAL
Qty: 120 | Refills: 3 | Status: ACTIVE | COMMUNITY
Start: 2020-10-07 | End: 1900-01-01

## 2021-06-11 RX ORDER — FUROSEMIDE 40 MG
40 TABLET ORAL ONCE
Refills: 0 | Status: COMPLETED | OUTPATIENT
Start: 2021-06-11 | End: 2021-06-11

## 2021-06-11 RX ADMIN — Medication 40 MILLIGRAM(S): at 22:01

## 2021-06-11 RX ADMIN — IOHEXOL 30 MILLILITER(S): 300 INJECTION, SOLUTION INTRAVENOUS at 21:05

## 2021-06-11 NOTE — ED PROVIDER NOTE - OBJECTIVE STATEMENT
67 y/o F with PMHx of CHF, pAfib, CABG 2020, in ED c/o leg swelling and abdominal "swelling", difficulty breathing. Patient states her leg has been swollen since her last cardiac surgery for the past year, recently became much worse and for the past 3-4 weeks her stomach had increased in size and with difficulty walking/breathing due to her stomach distention and leg swelling. Denies falls, fever, chills, cough, nausea, vomiting, diarrhea, appetite changes. Patient has been taking medication to better move her bowels, otherwise is usually constipated. Patient states she went to see her cardiologist today and when she told him she had difficulty breathing with ambulation he sent her to the ED. 65 y/o F with PMHx of CHF, pAfib, CABG 2020, breast CA 2004 w/ lumpectomy, no chemo/radiation, hysterectomy, in ED c/o leg swelling and abdominal "swelling", difficulty breathing. Patient states her leg has been swollen since her last cardiac surgery for the past year, recently became much worse and for the past 3-4 weeks her stomach had increased in size and with difficulty walking/breathing due to her stomach distention and leg swelling. Denies falls, fever, chills, cough, nausea, vomiting, diarrhea, appetite changes. Patient has been taking medication to better move her bowels, otherwise is usually constipated. Patient states she went to see her cardiologist today and when she told him she had difficulty breathing with ambulation he sent her to the ED.

## 2021-06-11 NOTE — ED PROVIDER NOTE - CONSTITUTIONAL, MLM
normal... No labored breathing, sitting comfortably in ED. Well appearing, awake, alert, oriented to person, place, time/situation and in no apparent distress.

## 2021-06-11 NOTE — ED ADULT NURSE NOTE - OBJECTIVE STATEMENT
Pt came to ED under direction of PMD for abd pain and distention with bilateral lower leg swelling x 1 month. PT presents with distended abd, firm upon palpation.   Pt reports SOB upon exertion, denies chest pain. Denies  symptoms, fevers, chills, D/N/V.  Pt is alert and orientedx3.  Positive PMS x4 extremities.

## 2021-06-11 NOTE — ED PROVIDER NOTE - PROGRESS NOTE DETAILS
seen and evaluated by cardiology fellow, unlikely cardiac  tamponade, no pericardial effusion. will admit for diuresis and further management.

## 2021-06-11 NOTE — ED PROVIDER NOTE - SKIN, MLM
Venous insufficiency skin changes noted to LE, chronic as per patient. Skin normal color for race, warm, dry and intact. No evidence of rash.

## 2021-06-11 NOTE — HISTORY OF PRESENT ILLNESS
[FreeTextEntry1] : 10/7/20: Since leaving AMA from [facility] in Dec 2019 for acute heart failure. Had CABG July 2020 ago at [facility].  Saw the CT surgeon once post operatively.  Notes her legs are swelling and she has trouble breathing.  has not been back to hospital since July.  Cant walk one block before getting short of breath.  no chest pain.  can lay only on her side, feels SOB when laying flat on her back.  says she is compliant with meds.  \par \par 10/14/20:  returns after increasing diuretic to torsemide 40BID.  patient has not been taking med as did not get it from pharmacy yet.  still with LE swelling, exertional sob.  \par \par 11/4/20: been taking torsemide 40mg BID.  Lost 12 lbs.  much improved breathing.  no sob. no cp.  leg swelling a lot better.   notes some tenderness/warmth/erythema below her knee over the last week, no trauma or breaks in skin.   \par 12/22/20: duplex with no dvt,.  LE swelling improving, has some GUTIERREZ after walking 1-2 blocks which is mild improvement since CABG.    no chest pain. no palps.  no orthopnea\par \par 1/20/21: has not been taking her meds regularly and her leg swelling has gotten a little worse.  no cp.  GUTIERREZ improving.  has had very sharp left arm pain for a month that radiates to hand.  worse at night.  unclear if had any trauma to area.  no arm swelling.  notes some numbness in her fingers\par \par 4/20/21:  fell at home one week ago due to uneven floor, hurt right knee and ankle.  Ankle is swollen and cannot move it well.  compliant with torsemide. doesn’t walk much, stays in apt all the time.  no chest pain\par \par 6/11/21: has had abdominal pain for 1 month, more so on the left side. abdomen feels "bigger". no chest pain. notes sob with walking just a few steps, LE swelling, and orthopnea.  Not sure if she is taking the torsemide.\par \par EKG: sinus bradycardia, 1st deg AVB, IVCD\par \par Non smoker\par No etoh\par NKDA \par Fhx: Mother CAD

## 2021-06-11 NOTE — ED PROVIDER NOTE - CLINICAL SUMMARY MEDICAL DECISION MAKING FREE TEXT BOX
67 y/o F with PMHx of CHF, pAFib, CABG 2020, poor historian, in ED c/o worsening of SOB and worsening of LE edema and abdominal pain/distention. Symptoms are chronic but recently became much worse. Patient afebrile, denies flu or cold-like symptoms. Plan for labs, imaging. Called cardiologist Dr. Cheatham to discuss case.

## 2021-06-11 NOTE — PROCEDURE
[FreeTextEntry1] : Procedure:\par Bilateral lower extremity venous duplex imaging was performed in the reverse Trendelenburg position, and reflex times were obtained in the supine position. \par \par FINDINGS: \par Right lower extremity:\par The right common femoral, deep femoral, saphenofemoral junction, femoral, popliteal, posterior tibial, great saphenous, and small saphenous veins were visualized and were normally compressible.  Color and Spectral Doppler flow pattern and response to augmentation maneuvers in the right common femoral, popliteal, and great saphenous veins were normal.\par Edema of the calf noted.\par \par Left lower extremity:\par The left common femoral, deep femoral, saphenofemoral junction, femoral, popliteal, posterior tibial, great saphenous, and small saphenous veins were visualized and were normally compressible. Color and Spectral Doppler flow pattern and response to augmentation maneuvers in the left common femoral, popliteal, and great saphenous veins were normal.\par  [de-identified] : MD Linden  [de-identified] : Nakul Argueta, KANDICE RVT  [de-identified] : Pain in the  limb - M79.605

## 2021-06-11 NOTE — ED ADULT TRIAGE NOTE - OTHER COMPLAINTS
c/o abd pain and swelling x 1 month, pt also reports swelling to lower legs and shortness of breath.  denies nausea, vomiting, fevers, chills.

## 2021-06-11 NOTE — ED ADULT NURSE REASSESSMENT NOTE - NS ED NURSE REASSESS COMMENT FT1
Pt delaying care, demanding clean bed sheets and gown be swapped out for new sheets and gown.  Pt taking exceedingly long time to change into gown, is on phone.  Pt is alert and oriented x3.

## 2021-06-11 NOTE — ED ADULT NURSE NOTE - PMH
CAD (coronary artery disease)    CHF (congestive heart failure)    History of breast lump removal

## 2021-06-11 NOTE — PHYSICAL EXAM
[Heart Rate And Rhythm] : heart rate and rhythm were normal [Heart Sounds] : normal S1 and S2 [Murmurs] : no murmurs present [Arterial Pulses Normal] : the arterial pulses were normal [Abdomen Soft] : soft [Abdomen Mass (___ Cm)] : no abdominal mass palpated [Abnormal Walk] : normal gait [Gait - Sufficient For Exercise Testing] : the gait was sufficient for exercise testing [Nail Clubbing] : no clubbing of the fingernails [Cyanosis, Localized] : no localized cyanosis [Petechial Hemorrhages (___cm)] : no petechial hemorrhages [Skin Color & Pigmentation] : normal skin color and pigmentation [] : no rash [No Venous Stasis] : no venous stasis [Skin Lesions] : no skin lesions [No Skin Ulcers] : no skin ulcer [No Xanthoma] : no  xanthoma was observed [FreeTextEntry1] : 1+ pitting edema b/l

## 2021-06-11 NOTE — ED ADULT NURSE REASSESSMENT NOTE - NS ED NURSE REASSESS COMMENT FT1
Pt received resting comfortably on stretcher, denies pain and discomfort. Safety precautions in place, will continue to monitor.

## 2021-06-12 ENCOUNTER — INPATIENT (INPATIENT)
Facility: HOSPITAL | Age: 66
LOS: 0 days | Discharge: HOME HEALTH | DRG: 292 | End: 2021-06-13
Attending: SPECIALIST/TECHNOLOGIST CARDIOVASCULAR | Admitting: SPECIALIST/TECHNOLOGIST CARDIOVASCULAR
Payer: MEDICARE

## 2021-06-12 DIAGNOSIS — Z90.710 ACQUIRED ABSENCE OF BOTH CERVIX AND UTERUS: Chronic | ICD-10-CM

## 2021-06-12 DIAGNOSIS — R14.0 ABDOMINAL DISTENSION (GASEOUS): ICD-10-CM

## 2021-06-12 DIAGNOSIS — I48.0 PAROXYSMAL ATRIAL FIBRILLATION: ICD-10-CM

## 2021-06-12 DIAGNOSIS — I50.23 ACUTE ON CHRONIC SYSTOLIC (CONGESTIVE) HEART FAILURE: ICD-10-CM

## 2021-06-12 DIAGNOSIS — I25.10 ATHEROSCLEROTIC HEART DISEASE OF NATIVE CORONARY ARTERY WITHOUT ANGINA PECTORIS: ICD-10-CM

## 2021-06-12 DIAGNOSIS — Z95.1 PRESENCE OF AORTOCORONARY BYPASS GRAFT: Chronic | ICD-10-CM

## 2021-06-12 LAB
CK MB CFR SERPL CALC: 1.7 NG/ML — SIGNIFICANT CHANGE UP (ref 0–6.7)
CK MB CFR SERPL CALC: 2.3 NG/ML — SIGNIFICANT CHANGE UP (ref 0–6.7)
CK SERPL-CCNC: 45 U/L — SIGNIFICANT CHANGE UP (ref 25–170)
CK SERPL-CCNC: 67 U/L — SIGNIFICANT CHANGE UP (ref 25–170)
D DIMER BLD IA.RAPID-MCNC: 467 NG/ML DDU — HIGH
TROPONIN T SERPL-MCNC: 0.01 NG/ML — SIGNIFICANT CHANGE UP (ref 0–0.01)
TROPONIN T SERPL-MCNC: 0.01 NG/ML — SIGNIFICANT CHANGE UP (ref 0–0.01)

## 2021-06-12 PROCEDURE — 93306 TTE W/DOPPLER COMPLETE: CPT | Mod: 26

## 2021-06-12 PROCEDURE — 99223 1ST HOSP IP/OBS HIGH 75: CPT

## 2021-06-12 RX ORDER — MONTELUKAST 4 MG/1
1 TABLET, CHEWABLE ORAL
Qty: 0 | Refills: 0 | DISCHARGE

## 2021-06-12 RX ORDER — METOPROLOL TARTRATE 50 MG
1 TABLET ORAL
Qty: 0 | Refills: 0 | DISCHARGE

## 2021-06-12 RX ORDER — MAGNESIUM OXIDE 400 MG ORAL TABLET 241.3 MG
400 TABLET ORAL DAILY
Refills: 0 | Status: DISCONTINUED | OUTPATIENT
Start: 2021-06-12 | End: 2021-06-13

## 2021-06-12 RX ORDER — QUETIAPINE FUMARATE 200 MG/1
1 TABLET, FILM COATED ORAL
Qty: 0 | Refills: 0 | DISCHARGE

## 2021-06-12 RX ORDER — FLUTICASONE FUROATE AND VILANTEROL TRIFENATATE 100; 25 UG/1; UG/1
0 POWDER RESPIRATORY (INHALATION)
Qty: 60 | Refills: 0 | DISCHARGE

## 2021-06-12 RX ORDER — ALBUTEROL 90 UG/1
2 AEROSOL, METERED ORAL
Qty: 0 | Refills: 0 | DISCHARGE

## 2021-06-12 RX ORDER — ATORVASTATIN CALCIUM 80 MG/1
80 TABLET, FILM COATED ORAL AT BEDTIME
Refills: 0 | Status: DISCONTINUED | OUTPATIENT
Start: 2021-06-12 | End: 2021-06-13

## 2021-06-12 RX ORDER — CICLOPIROX OLAMINE 7.7 MG/G
1 CREAM TOPICAL
Qty: 0 | Refills: 0 | DISCHARGE

## 2021-06-12 RX ORDER — LUBIPROSTONE 24 UG/1
0 CAPSULE, GELATIN COATED ORAL
Qty: 60 | Refills: 0 | DISCHARGE

## 2021-06-12 RX ORDER — LINACLOTIDE 145 UG/1
1 CAPSULE, GELATIN COATED ORAL
Qty: 0 | Refills: 0 | DISCHARGE

## 2021-06-12 RX ORDER — MAGNESIUM OXIDE 400 MG ORAL TABLET 241.3 MG
1 TABLET ORAL
Qty: 0 | Refills: 0 | DISCHARGE

## 2021-06-12 RX ORDER — FUROSEMIDE 40 MG
40 TABLET ORAL EVERY 12 HOURS
Refills: 0 | Status: DISCONTINUED | OUTPATIENT
Start: 2021-06-12 | End: 2021-06-13

## 2021-06-12 RX ORDER — GABAPENTIN 400 MG/1
0 CAPSULE ORAL
Qty: 60 | Refills: 0 | DISCHARGE

## 2021-06-12 RX ORDER — PANTOPRAZOLE SODIUM 20 MG/1
40 TABLET, DELAYED RELEASE ORAL
Refills: 0 | Status: DISCONTINUED | OUTPATIENT
Start: 2021-06-12 | End: 2021-06-13

## 2021-06-12 RX ORDER — SENNA PLUS 8.6 MG/1
1 TABLET ORAL
Qty: 0 | Refills: 0 | DISCHARGE

## 2021-06-12 RX ORDER — SOD,AMMONIUM,POTASSIUM LACTATE
1 CREAM (GRAM) TOPICAL
Qty: 0 | Refills: 0 | DISCHARGE

## 2021-06-12 RX ORDER — POLYETHYLENE GLYCOL 3350 17 G/17G
17 POWDER, FOR SOLUTION ORAL ONCE
Refills: 0 | Status: COMPLETED | OUTPATIENT
Start: 2021-06-12 | End: 2021-06-12

## 2021-06-12 RX ORDER — SACUBITRIL AND VALSARTAN 24; 26 MG/1; MG/1
1 TABLET, FILM COATED ORAL
Refills: 0 | Status: DISCONTINUED | OUTPATIENT
Start: 2021-06-12 | End: 2021-06-13

## 2021-06-12 RX ORDER — MONTELUKAST 4 MG/1
10 TABLET, CHEWABLE ORAL DAILY
Refills: 0 | Status: DISCONTINUED | OUTPATIENT
Start: 2021-06-12 | End: 2021-06-13

## 2021-06-12 RX ORDER — SENNA PLUS 8.6 MG/1
2 TABLET ORAL AT BEDTIME
Refills: 0 | Status: DISCONTINUED | OUTPATIENT
Start: 2021-06-12 | End: 2021-06-13

## 2021-06-12 RX ORDER — POTASSIUM CHLORIDE 20 MEQ
20 PACKET (EA) ORAL ONCE
Refills: 0 | Status: COMPLETED | OUTPATIENT
Start: 2021-06-12 | End: 2021-06-12

## 2021-06-12 RX ORDER — CARVEDILOL PHOSPHATE 80 MG/1
3.12 CAPSULE, EXTENDED RELEASE ORAL EVERY 12 HOURS
Refills: 0 | Status: DISCONTINUED | OUTPATIENT
Start: 2021-06-12 | End: 2021-06-13

## 2021-06-12 RX ORDER — DEXLANSOPRAZOLE 30 MG/1
0 CAPSULE, DELAYED RELEASE ORAL
Qty: 30 | Refills: 0 | DISCHARGE

## 2021-06-12 RX ORDER — MIRABEGRON 50 MG/1
0 TABLET, EXTENDED RELEASE ORAL
Qty: 30 | Refills: 0 | DISCHARGE

## 2021-06-12 RX ORDER — MIRABEGRON 50 MG/1
1 TABLET, EXTENDED RELEASE ORAL
Qty: 0 | Refills: 0 | DISCHARGE

## 2021-06-12 RX ORDER — CHOLECALCIFEROL (VITAMIN D3) 125 MCG
1 CAPSULE ORAL
Qty: 0 | Refills: 0 | DISCHARGE

## 2021-06-12 RX ORDER — MELOXICAM 15 MG/1
0 TABLET ORAL
Qty: 30 | Refills: 0 | DISCHARGE

## 2021-06-12 RX ORDER — LIPASE/PROTEASE/AMYLASE 16-48-48K
1 CAPSULE,DELAYED RELEASE (ENTERIC COATED) ORAL
Refills: 0 | Status: DISCONTINUED | OUTPATIENT
Start: 2021-06-12 | End: 2021-06-13

## 2021-06-12 RX ORDER — FLUTICASONE FUROATE AND VILANTEROL TRIFENATATE 100; 25 UG/1; UG/1
1 POWDER RESPIRATORY (INHALATION)
Qty: 0 | Refills: 0 | DISCHARGE

## 2021-06-12 RX ORDER — SOD,AMMONIUM,POTASSIUM LACTATE
1 CREAM (GRAM) TOPICAL
Refills: 0 | Status: DISCONTINUED | OUTPATIENT
Start: 2021-06-12 | End: 2021-06-13

## 2021-06-12 RX ORDER — LIDOCAINE 4 G/100G
1 CREAM TOPICAL
Qty: 0 | Refills: 0 | DISCHARGE

## 2021-06-12 RX ORDER — DEXLANSOPRAZOLE 30 MG/1
1 CAPSULE, DELAYED RELEASE ORAL
Qty: 0 | Refills: 0 | DISCHARGE

## 2021-06-12 RX ORDER — QUETIAPINE FUMARATE 200 MG/1
25 TABLET, FILM COATED ORAL DAILY
Refills: 0 | Status: DISCONTINUED | OUTPATIENT
Start: 2021-06-12 | End: 2021-06-13

## 2021-06-12 RX ORDER — RIVASTIGMINE 4.6 MG/24H
1 PATCH, EXTENDED RELEASE TRANSDERMAL
Qty: 0 | Refills: 0 | DISCHARGE

## 2021-06-12 RX ORDER — APIXABAN 2.5 MG/1
5 TABLET, FILM COATED ORAL EVERY 12 HOURS
Refills: 0 | Status: DISCONTINUED | OUTPATIENT
Start: 2021-06-12 | End: 2021-06-13

## 2021-06-12 RX ORDER — LIPASE/PROTEASE/AMYLASE 16-48-48K
1 CAPSULE,DELAYED RELEASE (ENTERIC COATED) ORAL
Qty: 0 | Refills: 0 | DISCHARGE

## 2021-06-12 RX ADMIN — POLYETHYLENE GLYCOL 3350 17 GRAM(S): 17 POWDER, FOR SOLUTION ORAL at 16:25

## 2021-06-12 RX ADMIN — Medication 40 MILLIGRAM(S): at 18:07

## 2021-06-12 RX ADMIN — APIXABAN 5 MILLIGRAM(S): 2.5 TABLET, FILM COATED ORAL at 17:50

## 2021-06-12 RX ADMIN — ATORVASTATIN CALCIUM 80 MILLIGRAM(S): 80 TABLET, FILM COATED ORAL at 21:53

## 2021-06-12 RX ADMIN — Medication 20 MILLIEQUIVALENT(S): at 09:27

## 2021-06-12 RX ADMIN — Medication 1 CAPSULE(S): at 17:50

## 2021-06-12 RX ADMIN — CARVEDILOL PHOSPHATE 3.12 MILLIGRAM(S): 80 CAPSULE, EXTENDED RELEASE ORAL at 17:50

## 2021-06-12 RX ADMIN — SACUBITRIL AND VALSARTAN 1 TABLET(S): 24; 26 TABLET, FILM COATED ORAL at 06:47

## 2021-06-12 RX ADMIN — APIXABAN 5 MILLIGRAM(S): 2.5 TABLET, FILM COATED ORAL at 06:47

## 2021-06-12 RX ADMIN — PANTOPRAZOLE SODIUM 40 MILLIGRAM(S): 20 TABLET, DELAYED RELEASE ORAL at 06:47

## 2021-06-12 RX ADMIN — SACUBITRIL AND VALSARTAN 1 TABLET(S): 24; 26 TABLET, FILM COATED ORAL at 17:50

## 2021-06-12 RX ADMIN — CARVEDILOL PHOSPHATE 3.12 MILLIGRAM(S): 80 CAPSULE, EXTENDED RELEASE ORAL at 06:47

## 2021-06-12 RX ADMIN — Medication 40 MILLIGRAM(S): at 07:46

## 2021-06-12 RX ADMIN — Medication 1 APPLICATION(S): at 17:50

## 2021-06-12 RX ADMIN — Medication 1 APPLICATION(S): at 06:48

## 2021-06-12 RX ADMIN — MAGNESIUM OXIDE 400 MG ORAL TABLET 400 MILLIGRAM(S): 241.3 TABLET ORAL at 11:52

## 2021-06-12 RX ADMIN — Medication 1 CAPSULE(S): at 13:39

## 2021-06-12 RX ADMIN — SENNA PLUS 2 TABLET(S): 8.6 TABLET ORAL at 21:54

## 2021-06-12 RX ADMIN — Medication 1 CAPSULE(S): at 09:17

## 2021-06-12 RX ADMIN — MONTELUKAST 10 MILLIGRAM(S): 4 TABLET, CHEWABLE ORAL at 11:46

## 2021-06-12 RX ADMIN — QUETIAPINE FUMARATE 25 MILLIGRAM(S): 200 TABLET, FILM COATED ORAL at 11:46

## 2021-06-12 RX ADMIN — Medication 1 TABLET(S): at 06:47

## 2021-06-12 NOTE — H&P ADULT - PROBLEM SELECTOR PLAN 1
+JVD, bibasilar rales, 2+ pitting edema bilaterally, sating 95% RA.     --CXR with pulmonary vascular congestion. BNP 4254.  --CT Abd/pelvis revealed cirrhosis with small to moderate ascites. Also incidentally noted to have RV cavity greater than LV cavity size. (recommended for Echo) +JVD, bibasilar rales, distended abdomen, 2+ pitting edema bilaterally, sating 95% RA.     --CXR with pulmonary vascular congestion. BNP 4254.  --CT Abd/pelvis revealed cirrhosis with small to moderate ascites. Also incidentally noted to have RV cavity greater than LV cavity size. (recommended for Echo)    --received Lasix 40mg IV x 1 dose in ED, will continue Lasix 40mg IV q 12hrs and uptitrate PRN.  --continue Entresto 49/51mg PO q 12hrs, strict I/Os and daily weights.  --obtain Echocardiogram.    **Prior Echo 12/5/2019 revealed mild LVH, severely reduced LVSF, EF:15% with global hypokinesis. Pulm HTN with PASP 50mmHg.

## 2021-06-12 NOTE — H&P ADULT - ASSESSMENT
66 yr old F with PMHx of breast CA 2004 s/p lumpectomy (no chemo/radiation), hysterectomy?, paroxysmal Afib (on Eliquis), HFrEF (15% in 2019), CAD with prior PCIs, CABG in 2020 who presents to [facility] ED 6/11/21 c/o worsening GUTIERREZ, LE edema and abdominal distension x few weeks, admitted to cardiac telemetry for management of acute on chronic systolic CHF exacerbation. 66 yr old F with PMHx of breast CA 2004 s/p lumpectomy (no chemo/radiation), uterine CA s/p hysterectomy, paroxysmal Afib (on Eliquis), HFrEF (15% in 2019), CAD with prior PCIs, CABG in 2020 who presents to [facility] ED 6/11/21 c/o worsening GUTIERREZ, LE edema and abdominal distension x few weeks, admitted to cardiac telemetry for management of acute on chronic systolic CHF exacerbation likely in setting of medication noncompliance.

## 2021-06-12 NOTE — H&P ADULT - NSICDXPASTMEDICALHX_GEN_ALL_CORE_FT
PAST MEDICAL HISTORY:  Breast cancer     CAD (coronary artery disease)     CHF (congestive heart failure)     History of breast lump removal     Paroxysmal atrial fibrillation     Systolic CHF, chronic

## 2021-06-12 NOTE — H&P ADULT - PROBLEM SELECTOR PLAN 3
hx of PCIs, CABG in 2020.     -- hx of PCIs, CABG in 7/2020@[facility].    --chest pain free, EKG SB with 1st degree AVB, low voltage/no ischemic changes.  --will continue BB/Statin.

## 2021-06-12 NOTE — H&P ADULT - PROBLEM SELECTOR PLAN 2
currently in SR, continue BB and Eliquis 5mg PO q 12hr. currently in SR, continue Coreg 3.125mg PO q 12hr (1/2 of home dose in setting of CHF).  --on Eliquis 5mg PO q 12hr for anticoagulation.

## 2021-06-12 NOTE — H&P ADULT - PROBLEM SELECTOR PLAN 4
BUN/Cr 17/1.07, Bilirubin 2.9, , AST/ALT wnl, UA unremarkable.    --CT Abd/pelvis revealed cirrhosis with small to moderate ascites. Also incidentally noted to have RV cavity greater than LV cavity size. (recommended for Echo). BUN/Cr 17/1.07, Bilirubin 2.9, , AST/ALT wnl, UA unremarkable.    --CT Abd/pelvis revealed cirrhosis with small to moderate ascites. Also incidentally noted to have RV cavity greater than LV cavity size. (recommended for Echo).      E: Replete to keep K>4.0 and Mg >2.0  N: DASH with 1 liter fluid restriction  VTE PPx: Eliquis  PT consulted: deconditioned and lives alone  CODE: Full

## 2021-06-13 ENCOUNTER — TRANSCRIPTION ENCOUNTER (OUTPATIENT)
Age: 66
End: 2021-06-13

## 2021-06-13 VITALS
OXYGEN SATURATION: 96 % | SYSTOLIC BLOOD PRESSURE: 105 MMHG | HEART RATE: 52 BPM | RESPIRATION RATE: 16 BRPM | DIASTOLIC BLOOD PRESSURE: 53 MMHG

## 2021-06-13 LAB
ANION GAP SERPL CALC-SCNC: 12 MMOL/L — SIGNIFICANT CHANGE UP (ref 5–17)
BUN SERPL-MCNC: 14 MG/DL — SIGNIFICANT CHANGE UP (ref 7–23)
CALCIUM SERPL-MCNC: 8.8 MG/DL — SIGNIFICANT CHANGE UP (ref 8.4–10.5)
CHLORIDE SERPL-SCNC: 98 MMOL/L — SIGNIFICANT CHANGE UP (ref 96–108)
CO2 SERPL-SCNC: 29 MMOL/L — SIGNIFICANT CHANGE UP (ref 22–31)
CREAT SERPL-MCNC: 0.92 MG/DL — SIGNIFICANT CHANGE UP (ref 0.5–1.3)
GLUCOSE SERPL-MCNC: 110 MG/DL — HIGH (ref 70–99)
HCT VFR BLD CALC: 34.5 % — SIGNIFICANT CHANGE UP (ref 34.5–45)
HGB BLD-MCNC: 10.7 G/DL — LOW (ref 11.5–15.5)
MAGNESIUM SERPL-MCNC: 2 MG/DL — SIGNIFICANT CHANGE UP (ref 1.6–2.6)
MCHC RBC-ENTMCNC: 28.8 PG — SIGNIFICANT CHANGE UP (ref 27–34)
MCHC RBC-ENTMCNC: 31 GM/DL — LOW (ref 32–36)
MCV RBC AUTO: 92.7 FL — SIGNIFICANT CHANGE UP (ref 80–100)
NRBC # BLD: 0 /100 WBCS — SIGNIFICANT CHANGE UP (ref 0–0)
PLATELET # BLD AUTO: 186 K/UL — SIGNIFICANT CHANGE UP (ref 150–400)
POTASSIUM SERPL-MCNC: 3.4 MMOL/L — LOW (ref 3.5–5.3)
POTASSIUM SERPL-SCNC: 3.4 MMOL/L — LOW (ref 3.5–5.3)
RBC # BLD: 3.72 M/UL — LOW (ref 3.8–5.2)
RBC # FLD: 17 % — HIGH (ref 10.3–14.5)
SODIUM SERPL-SCNC: 139 MMOL/L — SIGNIFICANT CHANGE UP (ref 135–145)
WBC # BLD: 9.94 K/UL — SIGNIFICANT CHANGE UP (ref 3.8–10.5)
WBC # FLD AUTO: 9.94 K/UL — SIGNIFICANT CHANGE UP (ref 3.8–10.5)

## 2021-06-13 PROCEDURE — 82140 ASSAY OF AMMONIA: CPT

## 2021-06-13 PROCEDURE — 99239 HOSP IP/OBS DSCHRG MGMT >30: CPT

## 2021-06-13 PROCEDURE — 82553 CREATINE MB FRACTION: CPT

## 2021-06-13 PROCEDURE — 84484 ASSAY OF TROPONIN QUANT: CPT

## 2021-06-13 PROCEDURE — 85027 COMPLETE CBC AUTOMATED: CPT

## 2021-06-13 PROCEDURE — 85025 COMPLETE CBC W/AUTO DIFF WBC: CPT

## 2021-06-13 PROCEDURE — 83880 ASSAY OF NATRIURETIC PEPTIDE: CPT

## 2021-06-13 PROCEDURE — 83735 ASSAY OF MAGNESIUM: CPT

## 2021-06-13 PROCEDURE — 36415 COLL VENOUS BLD VENIPUNCTURE: CPT

## 2021-06-13 PROCEDURE — 85379 FIBRIN DEGRADATION QUANT: CPT

## 2021-06-13 PROCEDURE — 80048 BASIC METABOLIC PNL TOTAL CA: CPT

## 2021-06-13 PROCEDURE — 80053 COMPREHEN METABOLIC PANEL: CPT

## 2021-06-13 PROCEDURE — 99285 EMERGENCY DEPT VISIT HI MDM: CPT

## 2021-06-13 PROCEDURE — 71046 X-RAY EXAM CHEST 2 VIEWS: CPT

## 2021-06-13 PROCEDURE — 87635 SARS-COV-2 COVID-19 AMP PRB: CPT

## 2021-06-13 PROCEDURE — 86769 SARS-COV-2 COVID-19 ANTIBODY: CPT

## 2021-06-13 PROCEDURE — 74177 CT ABD & PELVIS W/CONTRAST: CPT

## 2021-06-13 PROCEDURE — 93306 TTE W/DOPPLER COMPLETE: CPT

## 2021-06-13 PROCEDURE — 82550 ASSAY OF CK (CPK): CPT

## 2021-06-13 PROCEDURE — 85610 PROTHROMBIN TIME: CPT

## 2021-06-13 PROCEDURE — 83690 ASSAY OF LIPASE: CPT

## 2021-06-13 PROCEDURE — 81003 URINALYSIS AUTO W/O SCOPE: CPT

## 2021-06-13 RX ORDER — SACUBITRIL AND VALSARTAN 24; 26 MG/1; MG/1
1 TABLET, FILM COATED ORAL
Qty: 60 | Refills: 2
Start: 2021-06-13 | End: 2021-09-10

## 2021-06-13 RX ORDER — POTASSIUM CHLORIDE 20 MEQ
20 PACKET (EA) ORAL
Refills: 0 | Status: COMPLETED | OUTPATIENT
Start: 2021-06-13 | End: 2021-06-13

## 2021-06-13 RX ORDER — APIXABAN 2.5 MG/1
1 TABLET, FILM COATED ORAL
Qty: 0 | Refills: 0 | DISCHARGE

## 2021-06-13 RX ORDER — CARVEDILOL PHOSPHATE 80 MG/1
1 CAPSULE, EXTENDED RELEASE ORAL
Qty: 0 | Refills: 0 | DISCHARGE

## 2021-06-13 RX ORDER — ATORVASTATIN CALCIUM 80 MG/1
1 TABLET, FILM COATED ORAL
Qty: 30 | Refills: 3
Start: 2021-06-13 | End: 2021-10-10

## 2021-06-13 RX ORDER — ATORVASTATIN CALCIUM 80 MG/1
1 TABLET, FILM COATED ORAL
Qty: 0 | Refills: 0 | DISCHARGE

## 2021-06-13 RX ORDER — APIXABAN 2.5 MG/1
1 TABLET, FILM COATED ORAL
Qty: 60 | Refills: 0
Start: 2021-06-13 | End: 2021-07-12

## 2021-06-13 RX ORDER — CARVEDILOL PHOSPHATE 80 MG/1
1 CAPSULE, EXTENDED RELEASE ORAL
Qty: 60 | Refills: 3
Start: 2021-06-13 | End: 2021-10-10

## 2021-06-13 RX ORDER — SACUBITRIL AND VALSARTAN 24; 26 MG/1; MG/1
1 TABLET, FILM COATED ORAL
Qty: 0 | Refills: 0 | DISCHARGE

## 2021-06-13 RX ADMIN — Medication 1 CAPSULE(S): at 12:24

## 2021-06-13 RX ADMIN — PANTOPRAZOLE SODIUM 40 MILLIGRAM(S): 20 TABLET, DELAYED RELEASE ORAL at 06:04

## 2021-06-13 RX ADMIN — SACUBITRIL AND VALSARTAN 1 TABLET(S): 24; 26 TABLET, FILM COATED ORAL at 06:04

## 2021-06-13 RX ADMIN — Medication 20 MILLIEQUIVALENT(S): at 12:58

## 2021-06-13 RX ADMIN — Medication 40 MILLIGRAM(S): at 09:34

## 2021-06-13 RX ADMIN — Medication 1 TABLET(S): at 06:04

## 2021-06-13 RX ADMIN — Medication 1 APPLICATION(S): at 06:04

## 2021-06-13 RX ADMIN — Medication 1 CAPSULE(S): at 09:30

## 2021-06-13 RX ADMIN — Medication 20 MILLIEQUIVALENT(S): at 09:30

## 2021-06-13 RX ADMIN — APIXABAN 5 MILLIGRAM(S): 2.5 TABLET, FILM COATED ORAL at 06:04

## 2021-06-13 RX ADMIN — Medication 20 MILLIEQUIVALENT(S): at 12:24

## 2021-06-13 RX ADMIN — MAGNESIUM OXIDE 400 MG ORAL TABLET 400 MILLIGRAM(S): 241.3 TABLET ORAL at 12:24

## 2021-06-13 RX ADMIN — MONTELUKAST 10 MILLIGRAM(S): 4 TABLET, CHEWABLE ORAL at 12:24

## 2021-06-13 NOTE — DISCHARGE NOTE PROVIDER - NSDCMRMEDTOKEN_GEN_ALL_CORE_FT
ammonium lactate topical cream: Apply topically to affected area 2 times a day  atorvastatin 80 mg oral tablet: 1 tab(s) orally once a day   Breo Ellipta 200 mcg-25 mcg/inh inhalation powder: 1 puff(s) inhaled once a day  Cardiac Rehab: Please evaluate and treat pt w/ cardiac rehab 3 times per week for 12 weeks. Pt admitted 6/12-6/14 for diastolic CHF (EF 60-65%)  ciclopirox 0.77% topical gel: Apply topically to affected area 2 times a day  Coreg 6.25 mg oral tablet: 1 tab(s) orally 2 times a day  Creon 36,000 units oral delayed release capsule: 1 cap(s) orally 3 times a day  Dexilant 60 mg oral delayed release capsule: 1 cap(s) orally once a day  Eliquis 5 mg oral tablet: 1 tab(s) orally 2 times a day  Entresto 49 mg-51 mg oral tablet: 1 tab(s) orally 2 times a day  lidocaine 5% patch: 1 patch transdermal once a day  Linzess 145 mcg oral capsule: 1 cap(s) orally once a day  magnesium oxide 400 mg oral tablet: 1 tab(s) orally once a day  Myrbetriq 50 mg oral tablet, extended release: 1 tab(s) orally once a day  Oysco 500 with D 500 mg-200 intl units (5 mcg) oral tablet: 1 tab(s) orally 2 times a day  QUEtiapine 25 mg oral tablet: 1 tab(s) orally once a day  rivastigmine 4.6 mg/24 hr transdermal film, extended release: 1 patch transdermal once a day  Senna 8.6 mg oral tablet: 1 tab(s) orally once a day (at bedtime)  Singulair 10 mg oral tablet: 1 tab(s) orally once a day  torsemide 20 mg oral tablet: 2 tab(s) orally 2 times a day   Ventolin HFA 90 mcg/inh inhalation aerosol: 2 puff(s) inhaled every 6 hours  Vitamin D3 50,000 intl units (1250 mcg) oral capsule: 1 cap(s) orally every 7 days

## 2021-06-13 NOTE — DISCHARGE NOTE PROVIDER - INSTRUCTIONS
Please make sure to follow a low sodium (less than 2 grams or 2,000mg) and drink no more than 2 liters total of fluid per day. Please make sure to follow a low fat, low carbohydrate, low sodium (less than 2 grams or 2,000mg), heart healthy diet, and drink no more than 2 liters total of fluid per day.

## 2021-06-13 NOTE — PROGRESS NOTE ADULT - SUBJECTIVE AND OBJECTIVE BOX
INCOMPLETE  Interventional Cardiology PA Adult Progress Note    C.C.:     Subjective Assessment:      12 Point ROS Negative except as per Subjective and HPI  	  MEDICATIONS:  carvedilol 3.125 milliGRAM(s) Oral every 12 hours  furosemide   Injectable 40 milliGRAM(s) IV Push every 12 hours  sacubitril 49 mG/valsartan 51 mG 1 Tablet(s) Oral two times a day  montelukast 10 milliGRAM(s) Oral daily  QUEtiapine 25 milliGRAM(s) Oral daily  pancrelipase  (CREON 36,000 Lipase Units) 1 Capsule(s) Oral three times a day with meals  pantoprazole    Tablet 40 milliGRAM(s) Oral before breakfast  senna 2 Tablet(s) Oral at bedtime  atorvastatin 80 milliGRAM(s) Oral at bedtime  ammonium lactate 12% Lotion 1 Application(s) Topical two times a day  apixaban 5 milliGRAM(s) Oral every 12 hours  calcium carbonate 1250 mG  + Vitamin D (OsCal 500 + D) 1 Tablet(s) Oral two times a day  magnesium oxide 400 milliGRAM(s) Oral daily      PHYSICAL EXAM:  TELEMETRY:  T(C): 36.6 (06-13-21 @ 04:48), Max: 36.6 (06-12-21 @ 10:24)  HR: 70 (06-13-21 @ 00:39) (59 - 74)  BP: 128/70 (06-13-21 @ 00:39) (123/58 - 164/77)  RR: 18 (06-13-21 @ 00:39) (18 - 18)  SpO2: 94% (06-13-21 @ 00:39) (93% - 95%)  Wt(kg): --  I&O's Summary    12 Jun 2021 07:01  -  13 Jun 2021 07:00  --------------------------------------------------------  IN: 720 mL / OUT: 2650 mL / NET: -1930 mL                                        Appearance: Normal	  HEENT:   Normal oral mucosa, PERRL, EOMI	  Neck: Supple, + JVD/ - JVD; Carotid Bruit   Cardiovascular: Normal S1 S2, No JVD, No murmurs,   Respiratory: Lungs clear to auscultation/Decreased Breath Sounds/No Rales, Rhonchi, Wheezing	  Gastrointestinal:  Soft, Non-tender, + BS	  Skin: No rashes, No ecchymoses, No cyanosis  Extremities: Normal range of motion, No clubbing, cyanosis or edema  Vascular: Peripheral pulses palpable 2+ bilaterally  Neurologic: Non-focal  Psychiatry: A & O x 3, Mood & affect appropriate                            10.7   9.94  )-----------( 186      ( 13 Jun 2021 07:08 )             34.5     06-11    139  |  99  |  17  ----------------------------<  127<H>  3.8   |  28  |  1.07    Ca    9.2      11 Jun 2021 19:57    TPro  6.9  /  Alb  4.4  /  TBili  2.9<H>  /  DBili  x   /  AST  22  /  ALT  11  /  AlkPhos  122<H>  06-11    PT/INR - ( 11 Jun 2021 19:57 )   PT: 25.2 sec;   INR: 2.18

## 2021-06-13 NOTE — DISCHARGE NOTE NURSING/CASE MANAGEMENT/SOCIAL WORK - PATIENT PORTAL LINK FT
You can access the FollowMyHealth Patient Portal offered by [facility] by registering at the following website: [email or web address]. By joining [facility] [facility]’s FollowMyHealth portal, you will also be able to view your health information using other applications (apps) compatible with our system.

## 2021-06-13 NOTE — PROGRESS NOTE ADULT - ASSESSMENT
66 yr old F with PMHx of breast CA 2004 s/p lumpectomy (no chemo/radiation), uterine CA s/p hysterectomy, paroxysmal Afib (on Eliquis), HFrEF (15% in 2019), CAD with prior PCIs, CABG in 2020 who presents to [facility] ED 6/11/21 c/o worsening GUTIERREZ, LE edema and abdominal distension x few weeks, admitted to cardiac telemetry for management of acute on chronic systolic CHF exacerbation likely in setting of medication noncompliance.

## 2021-06-13 NOTE — PROGRESS NOTE ADULT - PROBLEM SELECTOR PLAN 1
+JVD, bibasilar rales, distended abdomen, 2+ pitting edema bilaterally, sating 95% RA.     --CXR with pulmonary vascular congestion. BNP 4254.  --CT Abd/pelvis revealed cirrhosis with small to moderate ascites. Also incidentally noted to have RV cavity greater than LV cavity size. (recommended for Echo)    --received Lasix 40mg IV x 1 dose in ED, will continue Lasix 40mg IV q 12hrs and uptitrate PRN.  --continue Entresto 49/51mg PO q 12hrs, strict I/Os and daily weights.  --obtain Echocardiogram.    **Prior Echo 12/5/2019 revealed mild LVH, severely reduced LVSF, EF:15% with global hypokinesis. Pulm HTN with PASP 50mmHg.

## 2021-06-13 NOTE — PROGRESS NOTE ADULT - PROBLEM SELECTOR PLAN 3
hx of PCIs, CABG in 7/2020@[facility].    --chest pain free, EKG SB with 1st degree AVB, low voltage/no ischemic changes.  --will continue BB/Statin.

## 2021-06-13 NOTE — PROGRESS NOTE ADULT - PROBLEM SELECTOR PLAN 2
currently in SR, continue Coreg 3.125mg PO q 12hr (1/2 of home dose in setting of CHF).  --on Eliquis 5mg PO q 12hr for anticoagulation.

## 2021-06-13 NOTE — DISCHARGE NOTE PROVIDER - NSDCCPCAREPLAN_GEN_ALL_CORE_FT
PRINCIPAL DISCHARGE DIAGNOSIS  Diagnosis: CHF (congestive heart failure)  Assessment and Plan of Treatment: You have a history of heart failure and should continue your daily lasix and spironolactone. You should weigh yourself daily and if you notice a weight gain of more than 2-3 pounds in 2 days, shortness of breath, or lower extremity swelling you should contact your doctor immediately. Please restrict your fluid intake to 1-2L daily.       PRINCIPAL DISCHARGE DIAGNOSIS  Diagnosis: CHF (congestive heart failure)  Assessment and Plan of Treatment: You have heart failure which means your heart muscle does not work properly causing a build-up of fluid in your body and lungs which is why you came to the hospital. It is very important that you take all your medications as prescribed. YOU MUST TAKE TORSEMIDE 40MG TWICE DAILY TO HELP REDUCE FLUID BUILD UP AND TO HELP YOUR HEART WORK BETTER! YOU MUST ALSO TAKE ENTRESTO 49/51MG TWO TIMES DAILY, COREG (CARVEDILOL) 6.25MG TWICE DAILY.  Follow a low salt diet (less than 2 grams per day) and drink less than 2 liters of fluid total per day. Check your weight regularly, if you find that you have suddenly gained more than 1-2 pounds in a day or 2-3 pounds in 3 days, are having trouble breathing, can no longer lay flat on your back, or that you are becoming swollen (ex. swollen legs), be sure to contact your doctor or come to the ER.   YOU MUST CALL DR BALDERAS'S OFFICE ON MONDAY 6/14/21 AND MAKE AN APPOINTMENT TO SEE HIM THIS WEEK!  We have provided you with a prescription for cardiac rehab which is medically supervised exercise program for your heart and has been shown to improve the quantity and quality of life of people with heart disease like yours. You should attend cardiac rehab 3 times per week for 12 weeks. We have provided you with a list of nearby facilities. Please call your insurance carrier to determine which of these facilities are covered under your plan. Please bring this prescription with you to your follow up appointment with your cardiologist who can then further assist you to enroll into a cardiac rehab program.      SECONDARY DISCHARGE DIAGNOSES  Diagnosis: Paroxysmal atrial fibrillation  Assessment and Plan of Treatment: You have a history of Atrial Fibrillation. This means your atrium do not contract properly and blood does not efficiently get pumped into the ventricles. This may lead to blood abnormally pooling in the ventricles and causing it to clot. This puts you at an increased risk for a stroke. Therefore, we started you on a heart rate controlling medication called Coreg (Carvedilol) 6.25mg twice daily and a blood-thinning medication called Eliquis 5mg twice daily.    Diagnosis: CAD (coronary artery disease)  Assessment and Plan of Treatment: You had coronary bypass surgery and stents in the past. Please make sure to continue taking your Carvedilol 6.25mg twice daily and your Atorvastatin 80mg once daily each night to help reduce your risk of heart disease progression.

## 2021-06-13 NOTE — DISCHARGE NOTE PROVIDER - CARE PROVIDER_API CALL
Terrence Cheatham)  Cardiology; Internal Medicine  [address] [place] [address]  Phone: [phone]  Fax: [phone]  Established Patient  Follow Up Time: 1 week

## 2021-06-13 NOTE — DISCHARGE NOTE PROVIDER - HOSPITAL COURSE
66 yr old F with PMHx of breast CA 2004 s/p lumpectomy (no chemo/radiation), uterine CA s/p hysterectomy, paroxysmal Afib (on Eliquis), CHF w/ recovered EF (EF now 60-65%, previously 15% in 2019) CAD with prior PCIs, CABG in 2020 who presents to [facility] ED 6/11/21 c/o worsening GUTIERREZ, LE edema and abdominal distension x few weeks, admitted to cardiac telemetry for management of acute on chronic diastolic CHF (grade 3 diastolic dysfunction) exacerbation.   Echo revealed EF 60-65%, grade 1 diastolic dysfunction, mild symmetric LVH, severely dilated RV, moderate TR, moderate TR, mild PA, PASP 56.  Pt is now s/p IV diuresis, euvolemic on exam, ambulated with RN without significant drop in SpO2, and has been deemed stable for d/c as per Dr Cheatham. Pt was advised to schedule f/u appointment to see Dr Cheatham within 1 week of discharge.  Pt will be d/c on home Torsemide 40mg BID, Coreg 6.25mg BID, Entresto 49/51mg BID, Eliquis 5mg BID, and Atorvastatin 80mg QHS.  Cardiac Rehab (CHF):            *Education on benefits of Cardiac Rehab provided to patient: Yes         *Referral and Prescription Given for Cardiac Rehab : Yes         *Pt given list of locations & instructed to contact their insurance company to review list of participating providers

## 2021-06-13 NOTE — DISCHARGE NOTE PROVIDER - NSDCFUADDAPPT_GEN_ALL_CORE_FT
PLEASE MAKE SURE TO CALL AND SCHEDULE AN APPOINTMENT TO FOLLOW UP WITH DR BALDERAS WITHIN 1 WEEK OF DISCHARGE!

## 2021-06-13 NOTE — PROGRESS NOTE ADULT - PROBLEM SELECTOR PLAN 4
BUN/Cr 17/1.07, Bilirubin 2.9, , AST/ALT wnl, UA unremarkable.    --CT Abd/pelvis revealed cirrhosis with small to moderate ascites. Also incidentally noted to have RV cavity greater than LV cavity size. (recommended for Echo).      E: Replete to keep K>4.0 and Mg >2.0  N: DASH with 1 liter fluid restriction  VTE PPx: Eliquis  PT consulted: deconditioned and lives alone  CODE: Full

## 2021-06-14 LAB
COVID-19 SPIKE DOMAIN AB INTERP: POSITIVE
COVID-19 SPIKE DOMAIN ANTIBODY RESULT: >250 U/ML — HIGH
SARS-COV-2 IGG+IGM SERPL QL IA: >250 U/ML — HIGH
SARS-COV-2 IGG+IGM SERPL QL IA: POSITIVE

## 2021-06-17 PROBLEM — I48.0 PAROXYSMAL ATRIAL FIBRILLATION: Chronic | Status: ACTIVE | Noted: 2021-06-12

## 2021-06-17 PROBLEM — I50.22 CHRONIC SYSTOLIC (CONGESTIVE) HEART FAILURE: Chronic | Status: ACTIVE | Noted: 2021-06-12

## 2021-06-17 PROBLEM — C50.919 MALIGNANT NEOPLASM OF UNSPECIFIED SITE OF UNSPECIFIED FEMALE BREAST: Chronic | Status: ACTIVE | Noted: 2021-06-12

## 2021-06-21 DIAGNOSIS — Z91.14 PATIENT'S OTHER NONCOMPLIANCE WITH MEDICATION REGIMEN: ICD-10-CM

## 2021-06-21 DIAGNOSIS — Z79.01 LONG TERM (CURRENT) USE OF ANTICOAGULANTS: ICD-10-CM

## 2021-06-21 DIAGNOSIS — I50.23 ACUTE ON CHRONIC SYSTOLIC (CONGESTIVE) HEART FAILURE: ICD-10-CM

## 2021-06-21 DIAGNOSIS — Z95.5 PRESENCE OF CORONARY ANGIOPLASTY IMPLANT AND GRAFT: ICD-10-CM

## 2021-06-21 DIAGNOSIS — K74.60 UNSPECIFIED CIRRHOSIS OF LIVER: ICD-10-CM

## 2021-06-21 DIAGNOSIS — Z95.1 PRESENCE OF AORTOCORONARY BYPASS GRAFT: ICD-10-CM

## 2021-06-21 DIAGNOSIS — Z85.3 PERSONAL HISTORY OF MALIGNANT NEOPLASM OF BREAST: ICD-10-CM

## 2021-06-21 DIAGNOSIS — I44.7 LEFT BUNDLE-BRANCH BLOCK, UNSPECIFIED: ICD-10-CM

## 2021-06-21 DIAGNOSIS — Z86.16 PERSONAL HISTORY OF COVID-19: ICD-10-CM

## 2021-06-21 DIAGNOSIS — I48.0 PAROXYSMAL ATRIAL FIBRILLATION: ICD-10-CM

## 2021-06-21 DIAGNOSIS — Z92.3 PERSONAL HISTORY OF IRRADIATION: ICD-10-CM

## 2021-06-21 DIAGNOSIS — Z90.710 ACQUIRED ABSENCE OF BOTH CERVIX AND UTERUS: ICD-10-CM

## 2021-06-21 DIAGNOSIS — I25.10 ATHEROSCLEROTIC HEART DISEASE OF NATIVE CORONARY ARTERY WITHOUT ANGINA PECTORIS: ICD-10-CM

## 2021-06-21 DIAGNOSIS — I11.0 HYPERTENSIVE HEART DISEASE WITH HEART FAILURE: ICD-10-CM

## 2021-06-21 DIAGNOSIS — R76.0 RAISED ANTIBODY TITER: ICD-10-CM

## 2021-06-21 DIAGNOSIS — I44.0 ATRIOVENTRICULAR BLOCK, FIRST DEGREE: ICD-10-CM

## 2021-06-21 DIAGNOSIS — R18.8 OTHER ASCITES: ICD-10-CM

## 2021-06-21 DIAGNOSIS — Z85.42 PERSONAL HISTORY OF MALIGNANT NEOPLASM OF OTHER PARTS OF UTERUS: ICD-10-CM

## 2021-08-06 ENCOUNTER — APPOINTMENT (OUTPATIENT)
Dept: HEART AND VASCULAR | Facility: CLINIC | Age: 66
End: 2021-08-06
Payer: MEDICARE

## 2021-08-13 ENCOUNTER — NON-APPOINTMENT (OUTPATIENT)
Age: 66
End: 2021-08-13

## 2021-08-13 ENCOUNTER — APPOINTMENT (OUTPATIENT)
Dept: HEART AND VASCULAR | Facility: CLINIC | Age: 66
End: 2021-08-13
Payer: MEDICARE

## 2021-08-13 VITALS
DIASTOLIC BLOOD PRESSURE: 63 MMHG | TEMPERATURE: 96.4 F | WEIGHT: 163 LBS | HEART RATE: 52 BPM | SYSTOLIC BLOOD PRESSURE: 101 MMHG | HEIGHT: 63 IN | BODY MASS INDEX: 28.88 KG/M2 | OXYGEN SATURATION: 97 %

## 2021-08-13 DIAGNOSIS — I27.20 PULMONARY HYPERTENSION, UNSPECIFIED: ICD-10-CM

## 2021-08-13 DIAGNOSIS — I48.0 PAROXYSMAL ATRIAL FIBRILLATION: ICD-10-CM

## 2021-08-13 DIAGNOSIS — I50.812 CHRONIC RIGHT HEART FAILURE: ICD-10-CM

## 2021-08-13 PROCEDURE — 93000 ELECTROCARDIOGRAM COMPLETE: CPT

## 2021-08-13 PROCEDURE — 99214 OFFICE O/P EST MOD 30 MIN: CPT

## 2021-08-13 NOTE — HISTORY OF PRESENT ILLNESS
[FreeTextEntry1] : 10/7/20: Since leaving AMA from [facility] in Dec 2019 for acute heart failure. Had CABG July 2020 ago at [facility].  Saw the CT surgeon once post operatively.  Notes her legs are swelling and she has trouble breathing.  has not been back to hospital since July.  Cant walk one block before getting short of breath.  no chest pain.  can lay only on her side, feels SOB when laying flat on her back.  says she is compliant with meds.  \par \par 10/14/20:  returns after increasing diuretic to torsemide 40BID.  patient has not been taking med as did not get it from pharmacy yet.  still with LE swelling, exertional sob.  \par \par 11/4/20: been taking torsemide 40mg BID.  Lost 12 lbs.  much improved breathing.  no sob. no cp.  leg swelling a lot better.   notes some tenderness/warmth/erythema below her knee over the last week, no trauma or breaks in skin.   \par 12/22/20: duplex with no dvt,.  LE swelling improving, has some GUTIERREZ after walking 1-2 blocks which is mild improvement since CABG.    no chest pain. no palps.  no orthopnea\par \par 1/20/21: has not been taking her meds regularly and her leg swelling has gotten a little worse.  no cp.  GUTIERREZ improving.  has had very sharp left arm pain for a month that radiates to hand.  worse at night.  unclear if had any trauma to area.  no arm swelling.  notes some numbness in her fingers\par \par 4/20/21:  fell at home one week ago due to uneven floor, hurt right knee and ankle.  Ankle is swollen and cannot move it well.  compliant with torsemide. doesn’t walk much, stays in apt all the time.  no chest pain\par \par 6/11/21: has had abdominal pain for 1 month, more so on the left side. abdomen feels "bigger". no chest pain. notes sob with walking just a few steps, LE swelling, and orthopnea.  Not sure if she is taking the torsemide.\par \par 8/13/21: admitted in June for right heart failure, much improved after 48hrs IV diuresis, diuretic regimen increased to torsemide 40mg bid which she says she is compliant with.  Breathing now much improved though she does have some GUTIERREZ.  No chest pain.  Leg swelling also present but improved from before. \par \par EKG: sinus bradycardia, 1st deg AVB, IVCD\par \par Non smoker\par No etoh\par NKDA \par Fhx: Mother CAD

## 2021-08-13 NOTE — PROCEDURE
[FreeTextEntry1] : Procedure:\par Bilateral lower extremity venous duplex imaging was performed in the reverse Trendelenburg position, and reflex times were obtained in the supine position. \par \par FINDINGS: \par Right lower extremity:\par The right common femoral, deep femoral, saphenofemoral junction, femoral, popliteal, posterior tibial, great saphenous, and small saphenous veins were visualized and were normally compressible.  Color and Spectral Doppler flow pattern and response to augmentation maneuvers in the right common femoral, popliteal, and great saphenous veins were normal.\par Edema of the calf noted.\par \par Left lower extremity:\par The left common femoral, deep femoral, saphenofemoral junction, femoral, popliteal, posterior tibial, great saphenous, and small saphenous veins were visualized and were normally compressible. Color and Spectral Doppler flow pattern and response to augmentation maneuvers in the left common femoral, popliteal, and great saphenous veins were normal.\par  [de-identified] : MD Linden  [de-identified] : Nakul Argueta, KANDICE RVT  [de-identified] : Pain in the  limb - M79.602

## 2021-08-13 NOTE — ASSESSMENT
[FreeTextEntry1] : 66 F\par \par Systolic Heart Failure - EF recovered since 2019\par Diastolic Heart Failure - \par Right Ventricular Failure - stable, volume status much improved on torsemide 40mg BID\par Pulmonary Hypertension\par CAD s/p CABG July 2020 - stable, no chest pain\par \par SInus Bradycardia EKG rates low 50s, unchanged\par Afib - in sinus\par EKG: sinus mando, IVCD, inferior infarct pattern, PRWP\par ECHO June 2021: normal EF, grade III diastolic dysfunction, mild symmetric LVH, severely dilated RV, reduced RV function moderate TR, moderate TR, mild OR, PASP 56. \par \par - torsemide 40mg BID, coreg 6.25mg BID, eliquis 5mg bid, amiodarone 200mg, lipitor 80mg, entresto 49-51mg, aspirin 81mg\par - liver appears cirrhotic on CT abd.  Recommend GI evaluation.  Likely due to right heart failure.\par - counseled on LE compression therapy, script given\par - check cbc, cmp, bnp \par

## 2021-08-13 NOTE — PHYSICAL EXAM
[Heart Rate And Rhythm] : heart rate and rhythm were normal [Heart Sounds] : normal S1 and S2 [Murmurs] : no murmurs present [Arterial Pulses Normal] : the arterial pulses were normal [Abdomen Soft] : soft [Abdomen Mass (___ Cm)] : no abdominal mass palpated [Abnormal Walk] : normal gait [Gait - Sufficient For Exercise Testing] : the gait was sufficient for exercise testing [Nail Clubbing] : no clubbing of the fingernails [Cyanosis, Localized] : no localized cyanosis [Petechial Hemorrhages (___cm)] : no petechial hemorrhages [] : no rash [Skin Lesions] : no skin lesions [No Skin Ulcers] : no skin ulcer [No Xanthoma] : no  xanthoma was observed [Abdomen Tenderness] : non-tender [FreeTextEntry1] : + venous stasis changes, + varicosities

## 2021-09-02 ENCOUNTER — APPOINTMENT (OUTPATIENT)
Dept: ORTHOPEDIC SURGERY | Facility: CLINIC | Age: 66
End: 2021-09-02

## 2021-11-19 ENCOUNTER — APPOINTMENT (OUTPATIENT)
Dept: HEART AND VASCULAR | Facility: CLINIC | Age: 66
End: 2021-11-19

## 2022-01-06 NOTE — PROGRESS NOTE ADULT - PROBLEM SELECTOR PROBLEM 7
[address] [place]. [place], [address]   Tel.  [phone]   Fax. [address] [facility] [address]   [place], [address] [facility]   Tel.  [phone]   Fax. [phone] [address] [facility] [address]   Tel.  [phone]   Fax. 700 [place] ,[place] [address] (: 1946) is a 67 y.o. female, established patient, seen for positive airway pressure follow-up and evaluation. She was last seen by me on 2021, seen by Dr. Idalia Gaston on 7/15/2020, prior notes reviewed in detail. Initial sleep apnea diagnosis approx . In lab split sleep test 2020 showed AHI of 33.9/hr with a lowest SaO2 of 88%, duration of SaO2 < 88% 0 min. ASSESSMENT/PLAN:    ICD-10-CM ICD-9-CM    1. BRENT (obstructive sleep apnea)  G47.33 327.23 AMB SUPPLY ORDER   2. Essential hypertension  I10 401.9    3. Type 2 diabetes with nephropathy (HCC)  E11.21 250.40      583.81    4. Adult BMI 30.0-30.9 kg/sq m  Z68.30 V85.30        AHI = 33.9(2020). On APAP, Respironics :  10-16 cmH2O. Set up 10/26/2020. New DS2 set up 2021    She is adherent with PAP therapy and PAP continues to benefit patient and remains necessary for control of her sleep apnea. Her device is affected by the [employer] recall, review of  Care  shows new device set up. Follow-up and Dispositions    · Return in about 1 year (around 2023) for annual follow up . 1. Sleep Apnea -  Continue on current pressures. * Supplies ordered - full face mask and heated tubing    Orders Placed This Encounter    AMB SUPPLY ORDER     Diagnosis: (G47.33) BRENT (obstructive sleep apnea)  (primary encounter diagnosis)     Replacement Supplies for Positive Airway Pressure Therapy Device:   Duration of need: 99 months.  Full Face Mask 1 every 3 months.  Full Face Mask Cushion 1 per month.  Headgear 1 every 6 months.    Pos Airway pressure chin strap     Tubing with heating element 1 every 3 months.  Filter(s) Disposable 2 per month.  Filter(s) Non-Disposable 1 every 6 months. .   [address] [facility] for Humidifier (Replace) 1 every 6 months. Garvin Habermann [facility]; NPI: [id number]    Electronically signed. Date:- 01/06/22         * Re-enforced proper and regular cleaning for the device. * She was asked to contact our office for any problems regarding PAP therapy. 2. Hypertension -  continue on her current regimen, she will continue to monitor her BP and follow up with her PMD for reevaluation/adjustment of medications if warranted. I have reviewed the relationship between hypertension as it relates to sleep-disordered breathing. 3. Type II diabetes -  she continues on her current regimen. I have reviewed the relationship between sleep disordered breathing as it relates to diabetes. 4. Encouraged continued weight management program through appropriate diet and exercise regimen as significant weight reduction has been shown to reduce severity of obstructive sleep apnea. SUBJECTIVE/OBJECTIVE:    She  is seen today for follow up on PAP device and reports no problems using the device. The following concerns identified:    Drowsiness no Problems exhaling no   Snoring no Forget to put on no   Mask Comfortable yes Can't fall asleep no   Dry Mouth yes Mask falls off no   Air Leaking no Frequent awakenings no       She admits that her sleep has improved on PAP therapy using nasal mask and heated tubing. She has issues with dryness and will consider full face mask and using humidifier in the bedroom. She has received replacement device and will start using. Review of device download indicated:  Auto pressure: 10-16 cmH2O;Peak Avg Pressure: 11.2 cmH2O;  Avg. Device Pressure <= 90 %: 10.0 cmH2O      Average % Night in Large Leak:  55.3  % Used Days >= 4 hours: 100. Avg hours used:  8:48.     Therapy Apnea Index averaged over PAP use: 3.4 /hr which reflects improved sleep breathing condition. Regina Sleepiness Score: 3 and Modified F.O.S.Q. Score Total / 2: 18.5 which reflects improved sleep quality over therapy time. Sleep Review of Systems: notable for Negative difficulty falling asleep; Negative awakenings at night; Negative early morning headaches; Negative memory problems; Negative concentration issues; Negative chest pain; Negative shortness of breath; Negative significant joint pain at night; Negative significant muscle pain at night; Negative rashes or itching; Negative heartburn; Negative significant mood issues; occasional afternoon naps per week      Visit Vitals  BP (!) 154/77   Pulse 65   Temp (!) 95.7 °F (35.4 °C)   Ht 5' 6\" (1.676 m)   Wt 191 lb 6.4 oz (86.8 kg)   SpO2 98%   BMI 30.89 kg/m²          General:   Alert, oriented, not in acute distress   Eyes:  Anicteric Sclerae; no obvious strabismus   Nose:  No obvious nasal septum deviation    Neck:   Midline trachea   Chest/Lungs:  Symmetrical lung expansion, clear lung fields on auscultation    CVS:  Normal rate, regular rhythm,  no JVD   Extremities:  No obvious rashes, no edema    Neuro:  No focal deficits; No obvious tremor    Psych:  Normal affect,  normal countenance     Patient's phone number [phone] (home)  was reviewed and confirmed for accuracy. She gives permission for messages regarding results and appointments to be left at that number. An electronic signature was used to authenticate this note.     -- Kyle Chapa NP, [facility]  01/06/22 Nutrition, metabolism, and development symptoms

## 2022-03-08 NOTE — ASSESSMENT
[FreeTextEntry1] : 66 F\par \par Systolic Heart Failure - decompensated, not compliant with meds. Abdominal pain may be related to fluid.\par Abd Pain - unclear etiology, may be 2/2 to volume overload  \par CAD s/p CABG July 2020 - stable, no chest pain\par SInus Bradycardia EKG rates low 50s, unchanged\par Afib - in sinus\par EKG: sinus mando, IVCD\par ECHO: moderate/severely depressed LVEF 35% (EF 15% in Dec 2019)\par \par - recommend she go to [facility] for decompensated CHF.  Would image abdomen as well given significant pain over the last month.  Saw GI earlier in year, has not had upper/lower endoscopy or any imaging done to date. She is agreeable, though says she needs to go home first.   Will follow up.  \par - torsemide 40mg, coreg 3.125mg BID, eliquis 5mg bid, amiodarone 200mg, lipitor 80mg, entresto 49-51mg\par \par \par  done

## 2023-01-01 NOTE — PROGRESS NOTE ADULT - REASON FOR ADMISSION
Acute on chronic CHF exacerbation
Statement Selected

## 2024-03-14 NOTE — H&P ADULT - HISTORY OF PRESENT ILLNESS
PNL panel ordered today, Pap collected  Genetics referral placed for counseling re FTS, AFP/quad screen  S/p COVID vaccine series, reviewed safety/recs in pregnancy  S/p flu vaccine  EPDS today = 12  Pt qualifies for LDA, will send rx at next visit at ~12wga   66 yr old F with PMHx of breast CA 2004 s/p lumpectomy (no chemo/radiation), hysterectomy?, paroxysmal Afib (on Eliquis), HFrEF (15% in 2019), CAD with prior PCIs, CABG in 2020 who presents to [facility] ED 6/11/21 c/o worsening GUTIERREZ, LE edema and abdominal distension x few weeks. Patient states her leg has been swollen since her last cardiac surgery for the past year, recently became much worse and for the past 3-4 weeks her stomach had increased in size and with difficulty walking/breathing due to her stomach distention and leg swelling.     Patient denies any fever, chills diaphoresis, N/V, dizziness, chest pain, palpitations, recent travel or sick contacts. Patient has been taking medication to better move her bowels, otherwise is usually constipated. Patient states she went to see her cardiologist today and when she told him she had difficulty breathing with ambulation he sent her to the ED.  In ED, BP: 134/82, HR: 70s, RR:18, Temp: 97.4F, O2 sat: 96% RA. EKG SR@62BPM with 1st degree AVB/LBBB. CXR with cardiomegaly/pulmonary vascular congestion.     Labs notable for: BUN/Cr 17/1.07, Bilirubin 2.9, , AST/ALT wnl, BNP 4254, UA unremarkable, COVID PCR negative.    CT Abd/pelvis revealed cirrhosis with small to moderate ascites. Also incidentally noted to have RV cavity greater than LV cavity size. (recommended for Echo).    Patient treated with Lasix 40mg IV x 1 dose.     Patient stable, admitted to cardiac telemetry for management of acute on chronic systolic CHF exacerbation.   66 yr old F with **NONCOMPLIANCE WITH MEDS/MD FOLLOW-UP, PMHx of right breast CA (s/p lumpectomy/radiation in 2004), uterine CA s/p hysterectomy, paroxysmal Afib (on Eliquis), HFrEF (15% in 2019), CAD with prior PCIs, CABG@[facility] in 7/2020 who presents to [facility] ED 6/11/21 c/o worsening GUTIERREZ, LE edema and abdominal distension x few weeks. Patient states her leg has been swollen since her last cardiac surgery for the past year, acutely worsening over the past 3-4 weeks. Patient also has noticed weight gain/increase in her abdominal girth and has difficulty walking >3-4 steps prior to having to stop and rest. Patient denies any fever, chills diaphoresis, N/V, dizziness, chest pain, palpitations, recent travel or sick contacts. Patient has been taking medication to better move her bowels, otherwise is usually constipated. Patient states she went to see her cardiologist Dr. Cheatham today and when she told him she had difficulty breathing with ambulation he sent her to the ED.  In ED, BP: 134/82, HR: 70s, RR:18, Temp: 97.4F, O2 sat: 96% RA. EKG SR@62BPM with 1st degree AVB/LBBB. CXR with cardiomegaly/pulmonary vascular congestion. Labs notable for: BUN/Cr 17/1.07, Bilirubin 2.9, , AST/ALT wnl, BNP 4254, UA unremarkable, COVID PCR negative. CT Abd/pelvis revealed cirrhosis with small to moderate ascites. Also incidentally noted to have RV cavity greater than LV cavity size. (recommended for Echo). Patient treated with Lasix 40mg IV x 1 dose.     Patient stable, admitted to cardiac telemetry for management of acute on chronic systolic CHF exacerbation likely in setting of medication noncompliance. 66 yr old F with **NONCOMPLIANCE WITH MEDS/MD FOLLOW-UP, PMHx of right breast CA (s/p lumpectomy/radiation in 2004), uterine CA s/p hysterectomy, paroxysmal Afib (on Eliquis), HFrEF (EF:15% in 2019), CAD with prior PCIs, CABG@[facility] in 7/2020 who presents to [facility] ED 6/11/21 c/o worsening GUTIERREZ, LE edema and abdominal distension x few weeks. Patient states her leg has been swollen since her last cardiac surgery for the past year, acutely worsening over the past 3-4 weeks. Patient also has noticed weight gain/increase in her abdominal girth and has difficulty walking >3-4 steps prior to having to stop and rest. Patient denies any fever, chills diaphoresis, N/V, dizziness, chest pain, palpitations, recent travel or sick contacts. Patient has been taking medication to better move her bowels, otherwise is usually constipated. Patient states she went to see her cardiologist Dr. Cheatham today and when she told him she had difficulty breathing with ambulation he sent her to the ED.  In ED, BP: 134/82, HR: 70s, RR:18, Temp: 97.4F, O2 sat: 96% RA. EKG SR@62BPM with 1st degree AVB/LBBB. CXR with cardiomegaly/pulmonary vascular congestion. Labs notable for: BUN/Cr 17/1.07, Bilirubin 2.9, , AST/ALT wnl, BNP 4254, UA unremarkable, COVID PCR negative. CT Abd/pelvis revealed cirrhosis with small to moderate ascites. Also incidentally noted to have RV cavity greater than LV cavity size. (recommended for Echo). Patient treated with Lasix 40mg IV x 1 dose.     Patient stable, admitted to cardiac telemetry for management of acute on chronic systolic CHF exacerbation likely in setting of medication noncompliance.

## 2024-12-27 NOTE — ED PROVIDER NOTE - INTERNATIONAL TRAVEL
[facility] provides services at a reduced cost to those who are determined to be eligible through [facility]’s financial assistance program. Information regarding [facility]’s financial assistance program can be found by going to [email or web address] or by calling [phone]. No